# Patient Record
Sex: FEMALE | Race: WHITE | NOT HISPANIC OR LATINO | Employment: OTHER | URBAN - METROPOLITAN AREA
[De-identification: names, ages, dates, MRNs, and addresses within clinical notes are randomized per-mention and may not be internally consistent; named-entity substitution may affect disease eponyms.]

---

## 2017-03-16 ENCOUNTER — OFFICE VISIT (OUTPATIENT)
Dept: ORTHOPEDIC SURGERY | Facility: CLINIC | Age: 62
End: 2017-03-16

## 2017-03-16 VITALS
HEART RATE: 65 BPM | HEIGHT: 64 IN | SYSTOLIC BLOOD PRESSURE: 160 MMHG | WEIGHT: 220 LBS | BODY MASS INDEX: 37.56 KG/M2 | DIASTOLIC BLOOD PRESSURE: 83 MMHG

## 2017-03-16 DIAGNOSIS — M17.12 PRIMARY OSTEOARTHRITIS OF LEFT KNEE: ICD-10-CM

## 2017-03-16 DIAGNOSIS — R52 PAIN: Primary | ICD-10-CM

## 2017-03-16 DIAGNOSIS — M17.11 PRIMARY OSTEOARTHRITIS OF RIGHT KNEE: ICD-10-CM

## 2017-03-16 PROCEDURE — 73562 X-RAY EXAM OF KNEE 3: CPT | Performed by: ORTHOPAEDIC SURGERY

## 2017-03-16 PROCEDURE — 99213 OFFICE O/P EST LOW 20 MIN: CPT | Performed by: ORTHOPAEDIC SURGERY

## 2017-03-16 NOTE — PROGRESS NOTES
Subjective: Right knee pain     Patient ID: Lianalu Patel is a 61 y.o. female.    Chief Complaint:    History of Present Illness 61-year-old female presents for elevation of the right knee.  She underwent a left total knee by me last September did extremely well with the right knee is now symptomatic.  She is failed in the past cortisone a viscus supplement injections and she is again having pain with activities of daily living wants to proceed with a right total knee arthroplasty.       Social History     Occupational History   • Not on file.     Social History Main Topics   • Smoking status: Never Smoker   • Smokeless tobacco: Never Used   • Alcohol use No   • Drug use: No   • Sexual activity: Defer      Review of Systems   Constitutional: Negative for chills, diaphoresis, fever and unexpected weight change.   HENT: Negative for hearing loss, nosebleeds, sore throat and tinnitus.    Eyes: Negative for pain and visual disturbance.   Respiratory: Negative for cough, shortness of breath and wheezing.    Cardiovascular: Negative for chest pain and palpitations.   Gastrointestinal: Negative for abdominal pain, diarrhea, nausea and vomiting.   Endocrine: Negative for cold intolerance, heat intolerance and polydipsia.   Genitourinary: Negative for difficulty urinating, dysuria and hematuria.   Musculoskeletal: Positive for arthralgias and myalgias. Negative for joint swelling.   Skin: Negative for rash and wound.   Allergic/Immunologic: Negative for environmental allergies.   Neurological: Negative for dizziness, syncope and numbness.   Hematological: Does not bruise/bleed easily.   Psychiatric/Behavioral: Negative for dysphoric mood and sleep disturbance. The patient is not nervous/anxious.          Past Medical History   Diagnosis Date   • Arthritis    • Hypertension    • Left knee DJD      sched TKA   • OA (osteoarthritis)      Past Surgical History   Procedure Laterality Date   • Sinus surgery     • Cholecystectomy      • Appendectomy     • Tubal abdominal ligation     • Pr total knee arthroplasty Left 9/13/2016     Procedure: TOTAL KNEE ARTHROPLASTY lc orthoalign;  Surgeon: Jerry Yarbrough MD;  Location: [facility];  Service: Orthopedics     Family History   Problem Relation Age of Onset   • Hypertension Mother    • Hypertension Father    • Hypertension Sister    • Hypertension Brother          Objective:  Vitals:    03/16/17 1337   BP: 160/83   Pulse: 65     Last 3 weights    03/16/17  1337   Weight: 220 lb (99.8 kg)     Body mass index is 37.76 kg/(m^2).       Ortho Exam  AP and lateral sunrise view of the right knee shows tricompartmental changes particularly in the patellofemoral medial compartment.  No prior x-rays for comparison.  Exam of the knee shows no motor deficit good distal pulses.  There is marked pain and crepitus with range of motion which is 0-125 is no patella subluxation.  Joint line tenderness but negative Kelle's.  No instability 0 90°.    Assessment:       1. Pain    2. Primary osteoarthritis of left knee    3. Primary osteoarthritis of right knee          Plan:      she wishes to proceed with total joint replacement we'll schedule as soon as possible.      Work Status:    [employer] query complete.    Orders:  Orders Placed This Encounter   Procedures   • XR Knee 3 View Right       Medications:  No orders of the defined types were placed in this encounter.      Followup:  Return in about 2 weeks (around 3/30/2017).          Dragon transcription disclaimer     Much of this encounter note is an electronic transcription/translation of spoken language to printed text. The electronic translation of spoken language may permit erroneous, or at times, nonsensical words or phrases to be inadvertently transcribed. Although I have reviewed the note for such errors, some may still exist.

## 2017-03-17 ENCOUNTER — PREP FOR SURGERY (OUTPATIENT)
Dept: ORTHOPEDIC SURGERY | Facility: CLINIC | Age: 62
End: 2017-03-17

## 2017-03-17 DIAGNOSIS — M17.11 PRIMARY OSTEOARTHRITIS OF RIGHT KNEE: Primary | ICD-10-CM

## 2017-03-17 RX ORDER — CELECOXIB 100 MG/1
400 CAPSULE ORAL ONCE
Status: CANCELLED | OUTPATIENT
Start: 2017-04-04

## 2017-03-17 RX ORDER — PREGABALIN 25 MG/1
150 CAPSULE ORAL ONCE
Status: CANCELLED | OUTPATIENT
Start: 2017-03-17 | End: 2017-03-17

## 2017-03-17 RX ORDER — ACETAMINOPHEN 10 MG/ML
1000 INJECTION, SOLUTION INTRAVENOUS ONCE
Status: CANCELLED | OUTPATIENT
Start: 2017-04-04

## 2017-03-17 RX ORDER — OXYCODONE HCL 10 MG/1
10 TABLET, FILM COATED, EXTENDED RELEASE ORAL ONCE
Status: CANCELLED | OUTPATIENT
Start: 2017-03-17 | End: 2017-03-17

## 2017-03-21 ENCOUNTER — PREP FOR SURGERY (OUTPATIENT)
Dept: ORTHOPEDIC SURGERY | Facility: CLINIC | Age: 62
End: 2017-03-21

## 2017-03-21 DIAGNOSIS — M17.11 PRIMARY OSTEOARTHRITIS OF RIGHT KNEE: Primary | ICD-10-CM

## 2017-03-22 ENCOUNTER — APPOINTMENT (OUTPATIENT)
Dept: PREADMISSION TESTING | Facility: HOSPITAL | Age: 62
End: 2017-03-22

## 2017-03-28 ENCOUNTER — APPOINTMENT (OUTPATIENT)
Dept: PREADMISSION TESTING | Facility: HOSPITAL | Age: 62
End: 2017-03-28

## 2017-03-28 ENCOUNTER — HOSPITAL ENCOUNTER (OUTPATIENT)
Dept: GENERAL RADIOLOGY | Facility: HOSPITAL | Age: 62
Discharge: HOME | End: 2017-03-28
Admitting: ORTHOPAEDIC SURGERY

## 2017-03-28 VITALS
SYSTOLIC BLOOD PRESSURE: 118 MMHG | DIASTOLIC BLOOD PRESSURE: 77 MMHG | HEIGHT: 64 IN | HEART RATE: 66 BPM | RESPIRATION RATE: 16 BRPM | WEIGHT: 231.4 LBS | OXYGEN SATURATION: 96 % | BODY MASS INDEX: 39.5 KG/M2

## 2017-03-28 DIAGNOSIS — M17.11 PRIMARY OSTEOARTHRITIS OF RIGHT KNEE: ICD-10-CM

## 2017-03-28 LAB
ABO GROUP BLD: NORMAL
ANION GAP SERPL CALCULATED.3IONS-SCNC: 12.1 MMOL/L
BASOPHILS # BLD AUTO: 0.06 10*3/MM3 (ref 0–0.2)
BASOPHILS NFR BLD AUTO: 0.4 % (ref 0–2)
BILIRUB UR QL STRIP: NEGATIVE
BLD GP AB SCN SERPL QL: NEGATIVE
BUN BLD-MCNC: 11 MG/DL (ref 8–23)
BUN/CREAT SERPL: 15.7 (ref 7–25)
CALCIUM SPEC-SCNC: 9.1 MG/DL (ref 8.8–10.5)
CHLORIDE SERPL-SCNC: 101 MMOL/L (ref 98–107)
CLARITY UR: CLEAR
CO2 SERPL-SCNC: 29.9 MMOL/L (ref 22–29)
COLOR UR: YELLOW
CREAT BLD-MCNC: 0.7 MG/DL (ref 0.57–1)
DEPRECATED RDW RBC AUTO: 43.3 FL (ref 37–54)
EOSINOPHIL # BLD AUTO: 0.35 10*3/MM3 (ref 0.1–0.3)
EOSINOPHIL NFR BLD AUTO: 2.5 % (ref 0–4)
ERYTHROCYTE [DISTWIDTH] IN BLOOD BY AUTOMATED COUNT: 13.5 % (ref 11.5–14.5)
GFR SERPL CREATININE-BSD FRML MDRD: 85 ML/MIN/1.73
GLUCOSE BLD-MCNC: 115 MG/DL (ref 65–99)
GLUCOSE UR STRIP-MCNC: NEGATIVE MG/DL
HCT VFR BLD AUTO: 40 % (ref 37–47)
HGB BLD-MCNC: 13.3 G/DL (ref 12–16)
HGB UR QL STRIP.AUTO: NEGATIVE
IMM GRANULOCYTES # BLD: 0.09 10*3/MM3 (ref 0–0.03)
IMM GRANULOCYTES NFR BLD: 0.7 % (ref 0–0.5)
INR PPP: 0.95 (ref 0.9–1.1)
KETONES UR QL STRIP: NEGATIVE
LEUKOCYTE ESTERASE UR QL STRIP.AUTO: NEGATIVE
LYMPHOCYTES # BLD AUTO: 6.36 10*3/MM3 (ref 0.6–4.8)
LYMPHOCYTES NFR BLD AUTO: 46.3 % (ref 20–45)
MCH RBC QN AUTO: 29.1 PG (ref 27–31)
MCHC RBC AUTO-ENTMCNC: 33.3 G/DL (ref 31–37)
MCV RBC AUTO: 87.5 FL (ref 81–99)
MONOCYTES # BLD AUTO: 0.76 10*3/MM3 (ref 0–1)
MONOCYTES NFR BLD AUTO: 5.5 % (ref 3–8)
NEUTROPHILS # BLD AUTO: 6.12 10*3/MM3 (ref 1.5–8.3)
NEUTROPHILS NFR BLD AUTO: 44.6 % (ref 45–70)
NITRITE UR QL STRIP: NEGATIVE
NRBC BLD MANUAL-RTO: 0 /100 WBC (ref 0–0)
PH UR STRIP.AUTO: 6 [PH] (ref 4.5–8)
PLATELET # BLD AUTO: 234 10*3/MM3 (ref 140–500)
PMV BLD AUTO: 9.7 FL (ref 7.4–10.4)
POTASSIUM BLD-SCNC: 4.2 MMOL/L (ref 3.5–5.2)
PROT UR QL STRIP: NEGATIVE
PROTHROMBIN TIME: 12.7 SECONDS (ref 12.1–15)
RBC # BLD AUTO: 4.57 10*6/MM3 (ref 4.2–5.4)
RH BLD: POSITIVE
SODIUM BLD-SCNC: 143 MMOL/L (ref 136–145)
SP GR UR STRIP: 1.01 (ref 1–1.03)
UROBILINOGEN UR QL STRIP: NORMAL
WBC NRBC COR # BLD: 13.74 10*3/MM3 (ref 4.8–10.8)

## 2017-03-28 PROCEDURE — 93005 ELECTROCARDIOGRAM TRACING: CPT

## 2017-03-28 PROCEDURE — 36415 COLL VENOUS BLD VENIPUNCTURE: CPT

## 2017-03-28 PROCEDURE — 86900 BLOOD TYPING SEROLOGIC ABO: CPT | Performed by: ORTHOPAEDIC SURGERY

## 2017-03-28 PROCEDURE — 86901 BLOOD TYPING SEROLOGIC RH(D): CPT | Performed by: ORTHOPAEDIC SURGERY

## 2017-03-28 PROCEDURE — 93010 ELECTROCARDIOGRAM REPORT: CPT | Performed by: INTERNAL MEDICINE

## 2017-03-28 PROCEDURE — 85025 COMPLETE CBC W/AUTO DIFF WBC: CPT | Performed by: ORTHOPAEDIC SURGERY

## 2017-03-28 PROCEDURE — 80048 BASIC METABOLIC PNL TOTAL CA: CPT | Performed by: ORTHOPAEDIC SURGERY

## 2017-03-28 PROCEDURE — 71020 HC CHEST PA AND LATERAL: CPT

## 2017-03-28 PROCEDURE — 85610 PROTHROMBIN TIME: CPT | Performed by: ORTHOPAEDIC SURGERY

## 2017-03-28 PROCEDURE — 81003 URINALYSIS AUTO W/O SCOPE: CPT | Performed by: ORTHOPAEDIC SURGERY

## 2017-03-28 PROCEDURE — 86850 RBC ANTIBODY SCREEN: CPT | Performed by: ORTHOPAEDIC SURGERY

## 2017-03-28 RX ORDER — MULTIPLE VITAMINS W/ MINERALS TAB 9MG-400MCG
1 TAB ORAL EVERY MORNING
COMMUNITY

## 2017-03-28 RX ORDER — ASPIRIN 81 MG/1
81 TABLET ORAL EVERY MORNING
COMMUNITY
End: 2017-04-07 | Stop reason: HOSPADM

## 2017-03-28 RX ORDER — POTASSIUM CHLORIDE 20 MEQ/1
20 TABLET, EXTENDED RELEASE ORAL 3 TIMES DAILY
COMMUNITY
End: 2022-03-18

## 2017-03-28 RX ORDER — AMLODIPINE BESYLATE 5 MG/1
5 TABLET ORAL 2 TIMES DAILY
COMMUNITY
End: 2017-04-07 | Stop reason: HOSPADM

## 2017-03-28 NOTE — PAT
PT. HERE FOR PAT VISIT. LABS, EKG, CXR COMPLETE. PT. DID NOT ATTEND JOINT CAMP B/C HAD L TKA LAST SEPT. PT. STATES STILL HAS JOINT CAMP BINDER. PRE-OP INSTRUCTIONS REVIEWED WITH PTKy RAMIREZ RN

## 2017-04-03 ENCOUNTER — OFFICE VISIT (OUTPATIENT)
Dept: ORTHOPEDIC SURGERY | Facility: CLINIC | Age: 62
End: 2017-04-03

## 2017-04-03 ENCOUNTER — ANESTHESIA EVENT (OUTPATIENT)
Dept: PERIOP | Facility: HOSPITAL | Age: 62
End: 2017-04-03

## 2017-04-03 ENCOUNTER — PREP FOR SURGERY (OUTPATIENT)
Dept: ORTHOPEDIC SURGERY | Facility: CLINIC | Age: 62
End: 2017-04-03

## 2017-04-03 DIAGNOSIS — M17.11 PRIMARY OSTEOARTHRITIS OF RIGHT KNEE: Primary | ICD-10-CM

## 2017-04-03 PROCEDURE — S0260 H&P FOR SURGERY: HCPCS | Performed by: ORTHOPAEDIC SURGERY

## 2017-04-03 NOTE — PROGRESS NOTES
Subjective: Right knee pain      Patient ID: Lianalu Patel is a 61 y.o. female.    Chief Complaint:    History of Present Illness patient seen for H&P done undergo right total knee arthroplasty tomorrow       Social History     Occupational History   • Not on file.     Social History Main Topics   • Smoking status: Never Smoker   • Smokeless tobacco: Never Used   • Alcohol use No   • Drug use: No   • Sexual activity: Defer      Review of Systems   Constitutional: Negative for chills, diaphoresis, fever and unexpected weight change.   HENT: Negative for hearing loss, nosebleeds, sore throat and tinnitus.    Eyes: Negative for pain and visual disturbance.   Respiratory: Negative for cough, shortness of breath and wheezing.    Cardiovascular: Negative for chest pain and palpitations.   Gastrointestinal: Negative for abdominal pain, diarrhea, nausea and vomiting.   Endocrine: Negative for cold intolerance, heat intolerance and polydipsia.   Genitourinary: Negative for difficulty urinating, dysuria and hematuria.   Musculoskeletal: Negative for arthralgias, joint swelling and myalgias.   Skin: Negative for rash and wound.   Allergic/Immunologic: Negative for environmental allergies.   Neurological: Negative for dizziness, syncope and numbness.   Hematological: Does not bruise/bleed easily.   Psychiatric/Behavioral: Negative for dysphoric mood and sleep disturbance. The patient is not nervous/anxious.    All other systems reviewed and are negative.        Past Medical History:   Diagnosis Date   • Arthritis    • Hypertension    • Kidney stones    • Left knee DJD     KNEE REPLACED   • OA (osteoarthritis)    • Right knee pain     SCHEDULED FOR REPLACEMENT     Past Surgical History:   Procedure Laterality Date   • APPENDECTOMY     • CHOLECYSTECTOMY     • [place] TOTAL KNEE ARTHROPLASTY Left 9/13/2016    Procedure: TOTAL KNEE ARTHROPLASTY lc orthoalign;  Surgeon: Jerry Yarbrough MD;  Location: [facility];  Service: Orthopedics    • SINUS SURGERY     • TUBAL ABDOMINAL LIGATION       Family History   Problem Relation Age of Onset   • Hypertension Mother    • Hypertension Father    • Hypertension Sister    • Hypertension Brother          Objective:  There were no vitals filed for this visit.  There were no vitals filed for this visit.  There is no height or weight on file to calculate BMI.       Ortho Exam  H&P completed    Assessment:       1. Primary osteoarthritis of right knee          Plan:      all questions regard.ing the surgery answered      Work Status:    ANGEL query complete.    Orders:  No orders of the defined types were placed in this encounter.      Medications:  No orders of the defined types were placed in this encounter.      Followup:  Return in about 2 weeks (around 4/17/2017).          Dragon transcription disclaimer     Much of this encounter note is an electronic transcription/translation of spoken language to printed text. The electronic translation of spoken language may permit erroneous, or at times, nonsensical words or phrases to be inadvertently transcribed. Although I have reviewed the note for such errors, some may still exist.

## 2017-04-03 NOTE — H&P
Orthopedic Surgery    Patient Care Team:  Cole Cam MD as PCP - General (Family Medicine)    CHIEF COMPLAINT: Right knee pain    HISTORY OF PRESENT ILLNESS: 61-year-old female with end-stage degenerative arthritis of the right knee that is failed to respond to nonoperative management is being admitted this time to undergo right total knee arthroplasty.  Patient is failed to respond to nonsteroidal anti-inflammatories, cortisone injection in viscus supplement injections and modification of activity.  Patient underwent a successful left total knee arthroplasty September.  Reviewed once again with her over the risk of surgery which include but not limited to an infection and the need for multiple procedures including implant removal to eradicate infection, DVT, foot drop, vascular injury, periprosthetic fracture, the need for revision surgery and the fact that 10-15% still a pain and discomfort despite a well implanted knee.  She understands these restrictions and agrees to proceed.  Also understands the lengthy rehabilitation which is 3 months to a year.          Past Medical History:   Diagnosis Date   • Arthritis    • Hypertension    • Kidney stones    • Left knee DJD     KNEE REPLACED   • OA (osteoarthritis)    • Right knee pain     SCHEDULED FOR REPLACEMENT     Past Surgical History:   Procedure Laterality Date   • APPENDECTOMY     • CHOLECYSTECTOMY     • [place] TOTAL KNEE ARTHROPLASTY Left 9/13/2016    Procedure: TOTAL KNEE ARTHROPLASTY lc orthoalign;  Surgeon: Jerry Yarbrough MD;  Location: [facility];  Service: Orthopedics   • SINUS SURGERY     • TUBAL ABDOMINAL LIGATION       Family History   Problem Relation Age of Onset   • Hypertension Mother    • Hypertension Father    • Hypertension Sister    • Hypertension Brother      Social History   Substance Use Topics   • Smoking status: Never Smoker   • Smokeless tobacco: Never Used   • Alcohol use No     No prescriptions prior to admission.      Allergies:  Bee venom; Latex; and Penicillins    REVIEW OF SYSTEMS:  Please see the above history of present illness for pertinent positives and negatives.  The remainder of the patient's systems have been reviewed and are negative.    Vital Signs            Physical Exam:  Physical Exam   Constitutional: Patient appears well-developed and well-nourished and in no acute distress   HEENT:   Head: Normocephalic and atraumatic.   Eyes:  Pupils are equal, round, and reactive to light.  Mouth and Throat: Patient has moist mucous membranes. Oropharynx is clear of any erythema or exudate.     Neck: Neck supple. No JVD present. No thyromegaly present. No lymphadenopathy present.  Cardiovascular: Regular rate, regular rhythm.  Pulmonary/Chest: Lungs are clear to auscultation bilaterally.  Abdominal:benign,soft with bowel sounds  Musculoskeletal: Normal posture.  Extremities:   Right leg shows good distal pulses no motor deficit.  She has 0-120° of motion with crepitus and pain throughout the arc of motion but no instability.  The skin is cool to touch.  His no sensory loss.  There is no hip pain.  There is no instability.  Neurological: Patient is alert and oriented.  Psychological:   Mood and behavior appropriate.  Skin: Skin is warm and dry.     Results Review:    I reviewed the patient's new clinical results.  Lab Results (most recent)     None          Imaging Results (most recent)     None            ECG/EMG Results (most recent)     None            Assessment/Plan End-stage degenerative arthritis right knee         I discussed the patients findings and my recommendations with patient and   Plan to proceed with right total knee arthroplasty    Jerry Yarbrough MD  04/03/17  2:40 PM

## 2017-04-04 ENCOUNTER — HOSPITAL ENCOUNTER (INPATIENT)
Facility: HOSPITAL | Age: 62
LOS: 3 days | Discharge: HOME | End: 2017-04-07
Attending: ORTHOPAEDIC SURGERY | Admitting: HOSPITALIST

## 2017-04-04 ENCOUNTER — APPOINTMENT (OUTPATIENT)
Dept: GENERAL RADIOLOGY | Facility: HOSPITAL | Age: 62
End: 2017-04-04

## 2017-04-04 ENCOUNTER — ANESTHESIA (OUTPATIENT)
Dept: PERIOP | Facility: HOSPITAL | Age: 62
End: 2017-04-04

## 2017-04-04 DIAGNOSIS — M17.11 PRIMARY OSTEOARTHRITIS OF RIGHT KNEE: ICD-10-CM

## 2017-04-04 LAB — POTASSIUM BLD-SCNC: 3.7 MMOL/L (ref 3.5–5.2)

## 2017-04-04 PROCEDURE — 25010000002 MIDAZOLAM PER 1 MG: Performed by: NURSE ANESTHETIST, CERTIFIED REGISTERED

## 2017-04-04 PROCEDURE — C1713 ANCHOR/SCREW BN/BN,TIS/BN: HCPCS | Performed by: ORTHOPAEDIC SURGERY

## 2017-04-04 PROCEDURE — 25010000002 PROPOFOL 1000 MG/ML EMULSION: Performed by: NURSE ANESTHETIST, CERTIFIED REGISTERED

## 2017-04-04 PROCEDURE — 27244 TREAT THIGH FRACTURE: CPT | Performed by: ORTHOPAEDIC SURGERY

## 2017-04-04 PROCEDURE — 25010000002 ONDANSETRON PER 1 MG: Performed by: NURSE ANESTHETIST, CERTIFIED REGISTERED

## 2017-04-04 PROCEDURE — XR2G021 MONITORING OF RIGHT KNEE JOINT USING INTRAOPERATIVE KNEE REPLACEMENT SENSOR, OPEN APPROACH, NEW TECHNOLOGY GROUP 1: ICD-10-PCS | Performed by: ORTHOPAEDIC SURGERY

## 2017-04-04 PROCEDURE — 84132 ASSAY OF SERUM POTASSIUM: CPT | Performed by: NURSE ANESTHETIST, CERTIFIED REGISTERED

## 2017-04-04 PROCEDURE — 25010000002 DIPHENHYDRAMINE PER 50 MG: Performed by: NURSE ANESTHETIST, CERTIFIED REGISTERED

## 2017-04-04 PROCEDURE — 99232 SBSQ HOSP IP/OBS MODERATE 35: CPT | Performed by: HOSPITALIST

## 2017-04-04 PROCEDURE — 25010000002 ROPIVACAINE PER 1 MG: Performed by: NURSE ANESTHETIST, CERTIFIED REGISTERED

## 2017-04-04 PROCEDURE — 73560 X-RAY EXAM OF KNEE 1 OR 2: CPT

## 2017-04-04 PROCEDURE — 25010000002 FENTANYL CITRATE (PF) 100 MCG/2ML SOLUTION: Performed by: NURSE ANESTHETIST, CERTIFIED REGISTERED

## 2017-04-04 PROCEDURE — 25010000002 DEXAMETHASONE PER 1 MG: Performed by: NURSE ANESTHETIST, CERTIFIED REGISTERED

## 2017-04-04 PROCEDURE — 20985 CPTR-ASST DIR MS PX: CPT | Performed by: ORTHOPAEDIC SURGERY

## 2017-04-04 PROCEDURE — 25010000002 VANCOMYCIN PER 500 MG: Performed by: ORTHOPAEDIC SURGERY

## 2017-04-04 PROCEDURE — C1776 JOINT DEVICE (IMPLANTABLE): HCPCS | Performed by: ORTHOPAEDIC SURGERY

## 2017-04-04 PROCEDURE — 0SRC0J9 REPLACEMENT OF RIGHT KNEE JOINT WITH SYNTHETIC SUBSTITUTE, CEMENTED, OPEN APPROACH: ICD-10-PCS | Performed by: ORTHOPAEDIC SURGERY

## 2017-04-04 PROCEDURE — L1830 KO IMMOB CANVAS LONG PRE OTS: HCPCS | Performed by: ORTHOPAEDIC SURGERY

## 2017-04-04 PROCEDURE — 94799 UNLISTED PULMONARY SVC/PX: CPT

## 2017-04-04 DEVICE — IMPLANTABLE DEVICE: Type: IMPLANTABLE DEVICE | Site: KNEE | Status: FUNCTIONAL

## 2017-04-04 DEVICE — SMARTSET HIGH PERFORMANCE MV MEDIUM VISCOSITY BONE CEMENT 40G
Type: IMPLANTABLE DEVICE | Site: KNEE | Status: FUNCTIONAL
Brand: SMARTSET

## 2017-04-04 DEVICE — COMP FEM TRIATH CR NO4 RT: Type: IMPLANTABLE DEVICE | Site: KNEE | Status: FUNCTIONAL

## 2017-04-04 DEVICE — INSRT TIB/KN TRIATHLON CONDY/STBL X3 SZ4 9MM: Type: IMPLANTABLE DEVICE | Site: KNEE | Status: FUNCTIONAL

## 2017-04-04 DEVICE — TOTL KN HI DEMAND STRYKER: Type: IMPLANTABLE DEVICE | Site: KNEE | Status: FUNCTIONAL

## 2017-04-04 DEVICE — BASEPLT TIB TRIATH CMT NO4: Type: IMPLANTABLE DEVICE | Site: KNEE | Status: FUNCTIONAL

## 2017-04-04 RX ORDER — BUPIVACAINE HYDROCHLORIDE 5 MG/ML
INJECTION, SOLUTION EPIDURAL; INTRACAUDAL AS NEEDED
Status: DISCONTINUED | OUTPATIENT
Start: 2017-04-04 | End: 2017-04-04 | Stop reason: SURG

## 2017-04-04 RX ORDER — MIDAZOLAM HYDROCHLORIDE 1 MG/ML
INJECTION INTRAMUSCULAR; INTRAVENOUS AS NEEDED
Status: DISCONTINUED | OUTPATIENT
Start: 2017-04-04 | End: 2017-04-04 | Stop reason: SURG

## 2017-04-04 RX ORDER — ACETAMINOPHEN 500 MG
1000 TABLET ORAL EVERY 6 HOURS
Status: DISCONTINUED | OUTPATIENT
Start: 2017-04-04 | End: 2017-04-07 | Stop reason: HOSPADM

## 2017-04-04 RX ORDER — CLINDAMYCIN PHOSPHATE 900 MG/50ML
900 INJECTION INTRAVENOUS ONCE
Status: COMPLETED | OUTPATIENT
Start: 2017-04-04 | End: 2017-04-04

## 2017-04-04 RX ORDER — POTASSIUM CHLORIDE 20 MEQ/1
20 TABLET, EXTENDED RELEASE ORAL 3 TIMES DAILY
Status: DISCONTINUED | OUTPATIENT
Start: 2017-04-04 | End: 2017-04-07 | Stop reason: HOSPADM

## 2017-04-04 RX ORDER — ACETAMINOPHEN 10 MG/ML
1000 INJECTION, SOLUTION INTRAVENOUS ONCE
Status: COMPLETED | OUTPATIENT
Start: 2017-04-04 | End: 2017-04-04

## 2017-04-04 RX ORDER — SODIUM CHLORIDE, SODIUM LACTATE, POTASSIUM CHLORIDE, CALCIUM CHLORIDE 600; 310; 30; 20 MG/100ML; MG/100ML; MG/100ML; MG/100ML
9 INJECTION, SOLUTION INTRAVENOUS CONTINUOUS PRN
Status: DISCONTINUED | OUTPATIENT
Start: 2017-04-04 | End: 2017-04-04 | Stop reason: HOSPADM

## 2017-04-04 RX ORDER — METOPROLOL TARTRATE 50 MG/1
100 TABLET, FILM COATED ORAL EVERY 12 HOURS SCHEDULED
Status: DISCONTINUED | OUTPATIENT
Start: 2017-04-04 | End: 2017-04-07 | Stop reason: HOSPADM

## 2017-04-04 RX ORDER — MAGNESIUM HYDROXIDE 1200 MG/15ML
LIQUID ORAL AS NEEDED
Status: DISCONTINUED | OUTPATIENT
Start: 2017-04-04 | End: 2017-04-04 | Stop reason: HOSPADM

## 2017-04-04 RX ORDER — PREGABALIN 75 MG/1
75 CAPSULE ORAL EVERY 12 HOURS SCHEDULED
Status: DISCONTINUED | OUTPATIENT
Start: 2017-04-04 | End: 2017-04-07 | Stop reason: HOSPADM

## 2017-04-04 RX ORDER — ONDANSETRON 2 MG/ML
4 INJECTION INTRAMUSCULAR; INTRAVENOUS ONCE AS NEEDED
Status: COMPLETED | OUTPATIENT
Start: 2017-04-04 | End: 2017-04-04

## 2017-04-04 RX ORDER — PREGABALIN 75 MG/1
150 CAPSULE ORAL ONCE
Status: COMPLETED | OUTPATIENT
Start: 2017-04-04 | End: 2017-04-04

## 2017-04-04 RX ORDER — SODIUM CHLORIDE, SODIUM LACTATE, POTASSIUM CHLORIDE, CALCIUM CHLORIDE 600; 310; 30; 20 MG/100ML; MG/100ML; MG/100ML; MG/100ML
30 INJECTION, SOLUTION INTRAVENOUS CONTINUOUS
Status: DISCONTINUED | OUTPATIENT
Start: 2017-04-04 | End: 2017-04-05

## 2017-04-04 RX ORDER — LIDOCAINE HYDROCHLORIDE 10 MG/ML
INJECTION, SOLUTION EPIDURAL; INFILTRATION; INTRACAUDAL; PERINEURAL
Status: DISPENSED
Start: 2017-04-04 | End: 2017-04-04

## 2017-04-04 RX ORDER — BISACODYL 5 MG/1
10 TABLET, DELAYED RELEASE ORAL DAILY PRN
Status: DISCONTINUED | OUTPATIENT
Start: 2017-04-04 | End: 2017-04-07 | Stop reason: HOSPADM

## 2017-04-04 RX ORDER — ROPIVACAINE HYDROCHLORIDE 5 MG/ML
INJECTION, SOLUTION EPIDURAL; INFILTRATION; PERINEURAL AS NEEDED
Status: DISCONTINUED | OUTPATIENT
Start: 2017-04-04 | End: 2017-04-04 | Stop reason: SURG

## 2017-04-04 RX ORDER — CELECOXIB 200 MG/1
200 CAPSULE ORAL DAILY
Status: DISCONTINUED | OUTPATIENT
Start: 2017-04-04 | End: 2017-04-07 | Stop reason: HOSPADM

## 2017-04-04 RX ORDER — SODIUM CHLORIDE, SODIUM LACTATE, POTASSIUM CHLORIDE, CALCIUM CHLORIDE 600; 310; 30; 20 MG/100ML; MG/100ML; MG/100ML; MG/100ML
1000 INJECTION, SOLUTION INTRAVENOUS CONTINUOUS PRN
Status: DISCONTINUED | OUTPATIENT
Start: 2017-04-04 | End: 2017-04-04 | Stop reason: HOSPADM

## 2017-04-04 RX ORDER — METOPROLOL SUCCINATE 50 MG/1
100 TABLET, EXTENDED RELEASE ORAL 2 TIMES DAILY
Status: DISCONTINUED | OUTPATIENT
Start: 2017-04-04 | End: 2017-04-04

## 2017-04-04 RX ORDER — FENTANYL CITRATE 50 UG/ML
INJECTION, SOLUTION INTRAMUSCULAR; INTRAVENOUS AS NEEDED
Status: DISCONTINUED | OUTPATIENT
Start: 2017-04-04 | End: 2017-04-04 | Stop reason: SURG

## 2017-04-04 RX ORDER — LIDOCAINE HYDROCHLORIDE 10 MG/ML
0.5 INJECTION, SOLUTION INFILTRATION; PERINEURAL ONCE AS NEEDED
Status: DISCONTINUED | OUTPATIENT
Start: 2017-04-04 | End: 2017-04-04 | Stop reason: HOSPADM

## 2017-04-04 RX ORDER — DIPHENHYDRAMINE HYDROCHLORIDE 50 MG/ML
12.5 INJECTION INTRAMUSCULAR; INTRAVENOUS ONCE
Status: COMPLETED | OUTPATIENT
Start: 2017-04-04 | End: 2017-04-04

## 2017-04-04 RX ORDER — VANCOMYCIN HCL-SODIUM CHLORIDE IV SOLN 1.5 GM/250ML-0.9% 1.5-0.9/25 GM/ML-%
15 SOLUTION INTRAVENOUS ONCE
Status: COMPLETED | OUTPATIENT
Start: 2017-04-05 | End: 2017-04-05

## 2017-04-04 RX ORDER — CELECOXIB 100 MG/1
400 CAPSULE ORAL ONCE
Status: COMPLETED | OUTPATIENT
Start: 2017-04-04 | End: 2017-04-04

## 2017-04-04 RX ORDER — DEXAMETHASONE SODIUM PHOSPHATE 4 MG/ML
INJECTION, SOLUTION INTRA-ARTICULAR; INTRALESIONAL; INTRAMUSCULAR; INTRAVENOUS; SOFT TISSUE AS NEEDED
Status: DISCONTINUED | OUTPATIENT
Start: 2017-04-04 | End: 2017-04-04 | Stop reason: SURG

## 2017-04-04 RX ORDER — BACITRACIN ZINC 500 [USP'U]/G
OINTMENT TOPICAL AS NEEDED
Status: DISCONTINUED | OUTPATIENT
Start: 2017-04-04 | End: 2017-04-04 | Stop reason: HOSPADM

## 2017-04-04 RX ORDER — SODIUM CHLORIDE 9 MG/ML
INJECTION, SOLUTION INTRAVENOUS AS NEEDED
Status: DISCONTINUED | OUTPATIENT
Start: 2017-04-04 | End: 2017-04-04 | Stop reason: HOSPADM

## 2017-04-04 RX ORDER — SODIUM CHLORIDE 0.9 % (FLUSH) 0.9 %
3 SYRINGE (ML) INJECTION AS NEEDED
Status: DISCONTINUED | OUTPATIENT
Start: 2017-04-04 | End: 2017-04-04 | Stop reason: HOSPADM

## 2017-04-04 RX ORDER — ASPIRIN 325 MG
325 TABLET ORAL EVERY 12 HOURS
Status: DISCONTINUED | OUTPATIENT
Start: 2017-04-04 | End: 2017-04-07 | Stop reason: HOSPADM

## 2017-04-04 RX ORDER — BISACODYL 10 MG
10 SUPPOSITORY, RECTAL RECTAL DAILY PRN
Status: DISCONTINUED | OUTPATIENT
Start: 2017-04-04 | End: 2017-04-07 | Stop reason: HOSPADM

## 2017-04-04 RX ORDER — SENNA AND DOCUSATE SODIUM 50; 8.6 MG/1; MG/1
2 TABLET, FILM COATED ORAL 2 TIMES DAILY
Status: DISCONTINUED | OUTPATIENT
Start: 2017-04-04 | End: 2017-04-07 | Stop reason: HOSPADM

## 2017-04-04 RX ORDER — OXYCODONE HCL 10 MG/1
10 TABLET, FILM COATED, EXTENDED RELEASE ORAL ONCE
Status: COMPLETED | OUTPATIENT
Start: 2017-04-04 | End: 2017-04-04

## 2017-04-04 RX ORDER — FAMOTIDINE 10 MG/ML
20 INJECTION, SOLUTION INTRAVENOUS ONCE
Status: COMPLETED | OUTPATIENT
Start: 2017-04-04 | End: 2017-04-04

## 2017-04-04 RX ORDER — OXYCODONE HYDROCHLORIDE 5 MG/1
10 TABLET ORAL EVERY 4 HOURS PRN
Status: DISCONTINUED | OUTPATIENT
Start: 2017-04-04 | End: 2017-04-06

## 2017-04-04 RX ORDER — AMLODIPINE BESYLATE 5 MG/1
5 TABLET ORAL 2 TIMES DAILY
Status: DISCONTINUED | OUTPATIENT
Start: 2017-04-04 | End: 2017-04-05

## 2017-04-04 RX ORDER — METOPROLOL TARTRATE 100 MG/1
100 TABLET ORAL 2 TIMES DAILY
COMMUNITY

## 2017-04-04 RX ORDER — CLINDAMYCIN PHOSPHATE 900 MG/50ML
900 INJECTION INTRAVENOUS EVERY 8 HOURS
Status: COMPLETED | OUTPATIENT
Start: 2017-04-04 | End: 2017-04-05

## 2017-04-04 RX ADMIN — ROPIVACAINE HYDROCHLORIDE 20 ML: 5 INJECTION, SOLUTION EPIDURAL; INFILTRATION; PERINEURAL at 13:10

## 2017-04-04 RX ADMIN — SODIUM CHLORIDE 1000 MG: 900 INJECTION, SOLUTION INTRAVENOUS at 12:11

## 2017-04-04 RX ADMIN — CLINDAMYCIN PHOSPHATE 900 MG: 18 INJECTION, SOLUTION INTRAVENOUS at 14:07

## 2017-04-04 RX ADMIN — MIDAZOLAM HYDROCHLORIDE 0.5 MG: 1 INJECTION, SOLUTION INTRAMUSCULAR; INTRAVENOUS at 13:04

## 2017-04-04 RX ADMIN — BUPIVACAINE HYDROCHLORIDE 3 ML: 5 INJECTION, SOLUTION EPIDURAL; INTRACAUDAL; PERINEURAL at 13:55

## 2017-04-04 RX ADMIN — OXYCODONE HYDROCHLORIDE 10 MG: 10 TABLET, FILM COATED, EXTENDED RELEASE ORAL at 11:31

## 2017-04-04 RX ADMIN — AMLODIPINE BESYLATE 5 MG: 5 TABLET ORAL at 19:44

## 2017-04-04 RX ADMIN — ACETAMINOPHEN 1000 MG: 500 TABLET ORAL at 19:02

## 2017-04-04 RX ADMIN — PROPOFOL 100 MCG/KG/MIN: 10 INJECTION, EMULSION INTRAVENOUS at 14:08

## 2017-04-04 RX ADMIN — ACETAMINOPHEN 1000 MG: 10 INJECTION, SOLUTION INTRAVENOUS at 12:09

## 2017-04-04 RX ADMIN — SODIUM CHLORIDE, POTASSIUM CHLORIDE, SODIUM LACTATE AND CALCIUM CHLORIDE 1000 ML: 600; 310; 30; 20 INJECTION, SOLUTION INTRAVENOUS at 12:10

## 2017-04-04 RX ADMIN — OXYCODONE HYDROCHLORIDE 10 MG: 5 TABLET ORAL at 19:02

## 2017-04-04 RX ADMIN — PREGABALIN 75 MG: 75 CAPSULE ORAL at 20:41

## 2017-04-04 RX ADMIN — DEXAMETHASONE SODIUM PHOSPHATE 4 MG: 4 INJECTION, SOLUTION INTRAMUSCULAR; INTRAVENOUS at 13:10

## 2017-04-04 RX ADMIN — MIDAZOLAM HYDROCHLORIDE 2 MG: 1 INJECTION, SOLUTION INTRAMUSCULAR; INTRAVENOUS at 13:00

## 2017-04-04 RX ADMIN — FAMOTIDINE 20 MG: 10 INJECTION, SOLUTION INTRAVENOUS at 12:54

## 2017-04-04 RX ADMIN — ASPIRIN 325 MG: 325 TABLET ORAL at 19:44

## 2017-04-04 RX ADMIN — SODIUM CHLORIDE 1000 MG: 9 INJECTION, SOLUTION INTRAVENOUS at 14:16

## 2017-04-04 RX ADMIN — FENTANYL CITRATE 50 MCG: 50 INJECTION, SOLUTION INTRAMUSCULAR; INTRAVENOUS at 15:33

## 2017-04-04 RX ADMIN — CELECOXIB 400 MG: 100 CAPSULE ORAL at 12:09

## 2017-04-04 RX ADMIN — CLINDAMYCIN PHOSPHATE 900 MG: 18 INJECTION, SOLUTION INTRAVENOUS at 21:26

## 2017-04-04 RX ADMIN — SODIUM CHLORIDE 1000 MG: 9 INJECTION, SOLUTION INTRAVENOUS at 15:32

## 2017-04-04 RX ADMIN — ONDANSETRON 4 MG: 2 INJECTION, SOLUTION INTRAMUSCULAR; INTRAVENOUS at 16:08

## 2017-04-04 RX ADMIN — DOCUSATE SODIUM AND SENNOSIDES 2 TABLET: 8.6; 5 TABLET, FILM COATED ORAL at 19:02

## 2017-04-04 RX ADMIN — POTASSIUM CHLORIDE 20 MEQ: 20 TABLET, EXTENDED RELEASE ORAL at 20:39

## 2017-04-04 RX ADMIN — ONDANSETRON 4 MG: 2 INJECTION, SOLUTION INTRAMUSCULAR; INTRAVENOUS at 12:55

## 2017-04-04 RX ADMIN — DIPHENHYDRAMINE HYDROCHLORIDE 12.5 MG: 50 INJECTION, SOLUTION INTRAMUSCULAR; INTRAVENOUS at 12:54

## 2017-04-04 RX ADMIN — FENTANYL CITRATE 25 MCG: 50 INJECTION, SOLUTION INTRAMUSCULAR; INTRAVENOUS at 13:04

## 2017-04-04 RX ADMIN — FENTANYL CITRATE 50 MCG: 50 INJECTION, SOLUTION INTRAMUSCULAR; INTRAVENOUS at 15:29

## 2017-04-04 RX ADMIN — CELECOXIB 200 MG: 200 CAPSULE ORAL at 19:01

## 2017-04-04 RX ADMIN — PREGABALIN 150 MG: 75 CAPSULE ORAL at 11:31

## 2017-04-04 NOTE — ANESTHESIA PROCEDURE NOTES
Peripheral Block    Patient location during procedure: pre-op  Start time: 4/4/2017 1:00 PM  Stop time: 4/4/2017 1:10 PM  Reason for block: at surgeon's request and post-op pain management  Performed by  CRNA: MASSIEL SAPP  Preanesthetic Checklist  Completed: patient identified, site marked, surgical consent, pre-op evaluation, timeout performed, IV checked, risks and benefits discussed and monitors and equipment checked  Peripheral Block Prep:  Sterile barriers:cap, gloves and mask  Prep: ChloraPrep  Patient monitoring: blood pressure monitoring, continuous pulse oximetry and EKG  Peripheral Procedure  Sedation:yes  Guidance:ultrasound guided  Images:still images obtained    Laterality:right  Block Type:adductor canal block  Injection Technique:single-shot  Needle Type:echogenic  Needle Gauge:21 G  ULTRASOUND INTERPRETATION.  Using ultrasound guidance a 21 G gauge needle was placed in close proximity to the brachial plexus (ACB) nerve, at which point, under ultrasound guidance anesthetic was injected in the area of the nerve and spread of the anesthesia was seen on ultrasound in close proximity thereto.  There were no abnormalities seen on ultrasound; a digital image was taken; and the patient tolerated the procedure with no complications.   Medications  Comment:DECADRON 4MG ADDED   LIDO 2% LOCAL    NEEDLE IN VIEW THROUGHOUT INJECTION  Local Injected:ropivacaine 0.5% Local Amount Injected:20mL  Post Assessment  Injection Assessment: negative aspiration for heme, no paresthesia on injection and incremental injection  Patient Tolerance:comfortable throughout block  Complications:no

## 2017-04-04 NOTE — PLAN OF CARE
Problem: Patient Care Overview (Adult)  Goal: Adult Individualization and Mutuality  Outcome: Ongoing (interventions implemented as appropriate)    04/04/17 1148   Individualization   Patient Specific Preferences goes by abner

## 2017-04-04 NOTE — H&P
[facility] MEDICAL [facility] HOSPITALIST     Cole Margaret Cam MD    CHIEF COMPLAINT: Right knee pain with end stage degenerative arthritis of the right knee    HISTORY OF PRESENT ILLNESS:    62 y/o female with hypertension, DJD and OA, Obesity and h/o nephrolithiasis (last stone 10 years ago) presented to the hospital today to undergo right total knee arthroplasty secondary to end stage degenerative arthritis of the right knee that failed to respond to non-operative management.  The patient failed to respond to nonsteroidal anti-inflammatories, cortisone injection and viscus supplement injections and modification of activity.  She underwent successful left TKA in September of last year. The patient is now post op and notes she is feeling well.  No recent illness (had the flu about a month ago but all symptoms resolved).  No pain currently as block still in effect. Patient denies any CP, SOA or heart palpitations.  Denies any F/C, N/V/D. Denies LE edema or lightheadedness.  Was in her usual state of health prior to surgery.       Past Medical History:   Diagnosis Date   • Arthritis    • Hypertension    • Kidney stones    • Left knee DJD     KNEE REPLACED   • OA (osteoarthritis)    • Right knee pain     SCHEDULED FOR REPLACEMENT     Past Surgical History:   Procedure Laterality Date   • APPENDECTOMY     • CHOLECYSTECTOMY     • CT TOTAL KNEE ARTHROPLASTY Left 9/13/2016    Procedure: TOTAL KNEE ARTHROPLASTY lc orthoalign;  Surgeon: Jerry Yarbrough MD;  Location: [facility];  Service: Orthopedics   • SINUS SURGERY     • TUBAL ABDOMINAL LIGATION       Family History   Problem Relation Age of Onset   • Hypertension Mother    • Hypertension Father    • Hypertension Sister    • Hypertension Brother      Social History   Substance Use Topics   • Smoking status: Never Smoker   • Smokeless tobacco: Never Used   • Alcohol use No     Prescriptions Prior to Admission   Medication Sig Dispense Refill Last Dose   • amLODIPine  (NORVASC) 5 MG tablet Take 5 mg by mouth 2 (Two) Times a Day.   4/4/2017 at 0700   • aspirin 81 MG EC tablet Take 81 mg by mouth Every Morning.   3/27/2017 at 0700   • lisinopril-hydrochlorothiazide (PRINZIDE,ZESTORETIC) 20-25 MG per tablet Take 1 tablet by mouth 2 (Two) Times a Day.   4/3/2017 at 0700   • metoprolol succinate XL (TOPROL-XL) 100 MG 24 hr tablet Take 100 mg by mouth 2 (Two) Times a Day.   4/4/2017 at 0700   • Multiple Vitamins-Minerals (MULTIVITAMIN WITH MINERALS) tablet tablet Take 1 tablet by mouth Every Morning.   4/3/2017 at 0700   • potassium chloride (K-DUR,KLOR-CON) 20 MEQ CR tablet Take 20 mEq by mouth 3 (Three) Times a Day.   4/3/2017 at 1900     Allergies:  Bee venom; Latex; and Penicillins    REVIEW OF SYSTEMS:  Please see the above history of present illness for pertinent positives and negatives.  The remainder of the patient's systems have been reviewed and are negative.     Vital Signs  Temp:  [97.5 °F (36.4 °C)-98.5 °F (36.9 °C)] 97.7 °F (36.5 °C)  Heart Rate:  [63-78] 70  Resp:  [15-20] 20  BP: ()/(52-73) 109/71  Oxygen Therapy  SpO2: 98 %  Pulse Oximetry Type: Continuous  O2 Device: nasal cannula  Flow (L/min): 2  EtCO2 (mm Hg) (Respiratory Monitoring): 42  There is no height or weight on file to calculate BMI.         Physical Exam:  Physical Exam   Constitutional: Patient appears well-developed, obese and in no acute distress   HEENT:   Head: Normocephalic and atraumatic.   Eyes:  Pupils are equal, round, and reactive to light. EOM are intact. Sclera are anicteric and non-injected.  Mouth and Throat: Patient has moist mucous membranes. Oropharynx is clear of any erythema or exudate.     Neck: Neck supple. No JVD noted (difficult to assess given patient's body habitus). No thyromegaly present. No lymphadenopathy present.  Cardiovascular: Regular rate, regular rhythm, S1 normal and S2 normal.  Exam reveals no gallop and no friction rub.  No murmur heard.  Pulmonary/Chest: Lungs  are clear to auscultation bilaterally. No respiratory distress. No wheezes. No rhonchi. No rales.   Abdominal: Obese. Soft. Bowel sounds are normal. No distension and no mass noted. There is no tenderness.   Musculoskeletal: Normal Muscle tone  Extremities: No edema. Pulses are palpable in all 4 extremities. Dressing and ice to RLE.  Neurological: Patient is alert and oriented to person, place, and time. Cranial nerves II-XII are grossly intact with no focal deficits.  Skin: Skin is warm. No rash noted. Nails show no clubbing.  No cyanosis or erythema.     Results Review:    I reviewed the patient's new clinical results.  Lab Results (most recent)     Procedure Component Value Units Date/Time    Potassium [[id number]]  (Normal) Collected:  04/04/17 1123    Specimen:  Blood Updated:  04/04/17 1141     Potassium 3.7 mmol/L           Imaging Results (most recent)     Procedure Component Value Units Date/Time    XR Knee 1 or 2 View Right [[id number]] Collected:  04/04/17 1628     Updated:  04/04/17 1632    Narrative:       EXAM: 2 views of the right knee.     DATE: 04/04/2017     HISTORY:: Right knee replacement today.     COMPARISON: 2 views the right knee 03/16/2017.     FINDINGS: Total right knee replacement changes are present. The  prosthesis appears appropriately seated. No periprosthetic fracture  is  seen. The knee joint appears appropriately aligned. Surgical drains in  place within the anterior soft tissues. Expected postoperative  subcutaneous air and soft tissue swelling.       Impression:       1. Satisfactory postoperative appearance status post right knee  replacement.     This report was finalized on 4/4/2017 4:29 PM by Dr. Ny Cameron MD.               ECG/EMG Results (most recent)     None            Assessment/Plan     1. End stage degenerative arthritis of the right knee s/p right TKA: Management per Dr. Yarbrough.  Patient on ASA for DVT prophy.  PT/OT ordered. Patient plans to go home, possibly  outpatient PT/OT.  No pain currently as block still in effect.    2. Hypertension: BP on low side post-op.  Will hold patient's lisinopril/HCTZ and norvasc for now, continue home metoprolol (confirmed with patient's pharmacy she takes tartrate not succinate).  Monitor, resume meds when BP will tolerate. Monitor.    3. Obesity: Nutrition to .    4. H/O Hypomagnesemia/Hypokalemia: continue patient's home potassium supplementation.  Check BMP and Mag in AM.    5. Chronic leukocytosis: Looking back in records has been present since our earliest labs in 8/2016 (between 11-14). She has never seen a hematologist but notes this has been present a long time. No acute infectious process suspected, unclear etiology.  Monitor post-op and F/U with PCP at discharge for further monitoring or W/U. Consider Hematology referral for further eval, lymphocytes intermittently elevated on differential.    6. DVT prophy: ASA per Ortho.    I discussed the patients findings and my recommendations with patient.     Janina Smallwood MD  04/04/17  6:08 PM

## 2017-04-04 NOTE — ANESTHESIA PREPROCEDURE EVALUATION
Anesthesia Evaluation     Patient summary reviewed and Nursing notes reviewed   NPO Status: > 8 hours   Airway   Mallampati: II  TM distance: >3 FB  Neck ROM: full  no difficulty expected  Dental - normal exam     Pulmonary - negative pulmonary ROS and normal exam    breath sounds clear to auscultation  Cardiovascular - normal exam    ECG reviewed  Patient on routine beta blocker and Beta blocker given within 24 hours of surgery    (+) hypertension well controlled,       Neuro/Psych- negative ROS  GI/Hepatic/Renal/Endo    (+) obesity,  chronic renal disease stones,     Musculoskeletal     Abdominal   (+) obese,    Substance History - negative use     OB/GYN          Other   (+) arthritis                                 Anesthesia Plan    ASA 2     regional and spinal   (RT ACB)  intravenous and inhalational induction   Anesthetic plan and risks discussed with patient.  Use of blood products discussed with patient  Consented to blood products.

## 2017-04-04 NOTE — PLAN OF CARE
Problem: Perioperative Period (Adult)  Goal: Signs and Symptoms of Listed Potential Problems Will be Absent or Manageable (Perioperative Period)  Outcome: Ongoing (interventions implemented as appropriate)    04/04/17 1149   Perioperative Period   Problems Assessed (Perioperative Period) pain   Problems Present (Perioperative Period) pain;physiologic stress response

## 2017-04-04 NOTE — PLAN OF CARE
Problem: Patient Care Overview (Adult)  Goal: Plan of Care Review  Outcome: Ongoing (interventions implemented as appropriate)    04/04/17 7703   Coping/Psychosocial Response Interventions   Plan Of Care Reviewed With patient   Patient Care Overview   Progress improving   Outcome Evaluation   Outcome Summary/Follow up Plan vss, waiting to go to floor

## 2017-04-04 NOTE — OP NOTE
Date of Operation:  04/04/2017    PREOPERATIVE DIAGNOSIS: End-stage degenerative arthritis, right knee.     POSTOPERATIVE DIAGNOSIS: End-stage degenerative arthritis, right knee.     PROCEDURE PERFORMED: Right total knee arthroplasty using Zay #4 cruciate-retaining femoral component, #4 tibial tray, 9 mm CS insert with #32 patella button with antibiotic cement and the OrthAlign navigational system.     SURGEON: Jerry Yarbrough MD     ASSISTANT:  KRISTAL Carrasco    ANESTHESIA: Spinal with adductor canal block.     TOURNIQUET TIME: 53 minutes.     ESTIMATED BLOOD LOSS: 100 mL.     DRAIN: Drain x1.     DESCRIPTION OF PROCEDURE: The patient was brought to the operating room and after satisfactory anesthesia was obtained a pneumonic tourniquet was placed around the right upper leg. The patient was given preoperatively 900 of Cleocin and 2 g of vancomycin, 1 g of IV Tylenol, and 1 g of tranexamic acid. After the tourniquet was applied a timeout was completed, identifying the patient and the procedure correctly. The leg was then prepped and after the prep had dried for 3 minutes it was draped in a sterile field in the usual manner. Timeout again was completed. The leg was then exsanguinated. The tourniquet was elevated to 250 mmHg. A midline incision was then made and full-thickness subcutaneous flaps were developed. A medial arthrotomy incision was carried out. The patella subluxed laterally and then the infrapatellar fat pad and anterior cruciate supracondylar fat pad were excised. The OrthAlign navigational system was then applied to the femur and after the appropriate navigational maneuvers were carried out the distal femoral cut was completed. The patient templated for a #4 femoral component. With the #4 jig in place, the anterior, posterior, and chamfer cuts were completed. Lateral and medial peripheral osteophytes were then removed and a trial reduction with the #4 femoral component showed excellent bone to  prosthesis contact. A posterior cruciate retractor was then inserted and the remainder of the menisci were removed. The OrthAlign tibial navigational system was then applied to the tibia, and again after the appropriate navigational maneuvers the proximal tibial cut was completed. Gap balancing testing was then noted to be symmetrical. Inspection of the posterior recess showed no further menisci. Then 40 mL of the Exparel solution, which was 20 of Exparel, 60 of 0.25% plain, and 60 of normal saline, was mixed and 40 again was injected in the posterior capsule. The patella was then cut for a #32 patella button. Keel cut was made in the tibia after trial reduction with the #4 tray with the #9 insert, and the drill holes were made in the femur. Bone was lavaged and packed dry while 2 packs of antibiotic cement were prepared. Once the cement was ready the #4 nonporous keel tibial tray was cemented into place. After complete seating and removal of the excess cement the 9 mm CS insert was placed over the tibial tray and then the #4 femoral component was cemented into place. After removal of the excess cement the knee was brought out to full extension and axially loaded while the #32 patella button was cemented into place. While the cement was hardening the remainder of the Exparel solution was injected in the deep and superficial tissues. Once the cement had hardened the tourniquet was released. The patient was given a 2nd gram of tranexamic acid. The knee was then lavaged with Betadine and normal saline. A Hemovac was placed in the deep recesses of the wound and after the hemostasis was obtained 1 g of vancomycin powder was placed in the deep and superficial tissues. The arthrotomy was closed with #2 Vicryl in corner stitches and a running #1 StrataFix subcutaneous was closed with 0 StrataFix and 2-0 StrataFix and the skin was closed with a Prineo Dermabond dressing. Sterile dressing was then applied to the wound and the  patient was transferred to recovery, having tolerated the procedure well.       JENNY Burnette  D:  04/04/2017 15:48:41   T:  04/04/2017 16:57:50   Job ID:  [id number]   Document ID:  [id number]  cc:

## 2017-04-04 NOTE — PLAN OF CARE
Problem: Patient Care Overview (Adult)  Goal: Adult Individualization and Mutuality  Outcome: Ongoing (interventions implemented as appropriate)    04/04/17 1148   Individualization   Patient Specific Preferences goes by abner   Patient Specific Goals go home thursday   Patient Specific Interventions pain control   Mutuality/Individual Preferences   What Anxieties, Fears or Concerns Do You Have About Your Health or Care? none    What Questions Do You Have About Your Health or Care? i am good   What Information Would Help Us Give You More Personalized Care? i had the left one done last year

## 2017-04-04 NOTE — ANESTHESIA POSTPROCEDURE EVALUATION
Patient: Liana Patel    Procedure Summary     Date Anesthesia Start Anesthesia Stop Room / Location    04/04/17 1400 1608 [facility]       Procedure Diagnosis Surgeon Provider    TOTAL KNEE ARTHROPLASTY, dorian platt (Right Knee) Primary osteoarthritis of right knee  (Primary osteoarthritis of right knee [M17.11]) MD Agnes Blankenship CRNA          Anesthesia Type: regional, spinal  Last vitals  BP      Temp      Pulse     Resp      SpO2        Post Anesthesia Care and Evaluation    Patient location during evaluation: bedside  Patient participation: complete - patient participated  Level of consciousness: awake and alert  Pain management: adequate  Airway patency: patent  Anesthetic complications: No anesthetic complications  PONV Status: controlled  Cardiovascular status: acceptable  Respiratory status: acceptable  Hydration status: acceptable

## 2017-04-04 NOTE — PLAN OF CARE
Problem: Perioperative Period (Adult)  Goal: Signs and Symptoms of Listed Potential Problems Will be Absent or Manageable (Perioperative Period)  Outcome: Ongoing (interventions implemented as appropriate)    04/04/17 6980   Perioperative Period   Problems Assessed (Perioperative Period) all   Problems Present (Perioperative Period) none

## 2017-04-04 NOTE — PLAN OF CARE
Problem: Patient Care Overview (Adult)  Goal: Plan of Care Review  Outcome: Ongoing (interventions implemented as appropriate)    04/04/17 1147   Coping/Psychosocial Response Interventions   Plan Of Care Reviewed With patient   Patient Care Overview   Progress improving   Outcome Evaluation   Outcome Summary/Follow up Plan vss ready for procedure

## 2017-04-04 NOTE — ANESTHESIA PROCEDURE NOTES
Spinal Block    Patient location during procedure: pre-op  Start Time: 4/4/2017 1:47 PM  Stop Time: 4/4/2017 1:55 PM  Indication:at surgeon's request and procedure for pain  Performed By  CRNA: OCTAVIO RUBIO  Preanesthetic Checklist  Completed: patient identified, surgical consent, pre-op evaluation, timeout performed, IV checked, risks and benefits discussed and monitors and equipment checked  Spinal Block Prep:  Patient Position:sitting  Sterile Tech:cap, gloves, mask and sterile barriers  Prep:Betadine and x3  Patient Monitoring:blood pressure monitoring, continuous pulse oximetry and EKG  Spinal Block Procedure  Approach:midline  Guidance:landmark technique and palpation technique  Location:L3-L4  Needle Type:Pencan  Needle Gauge:27 G  Placement of Spinal needle event:cerebrospinal fluid aspirated  Paresthesia: noPost Assessment  Patient Tolerance:patient tolerated the procedure well with no apparent complications  Complications no

## 2017-04-05 LAB
ANION GAP SERPL CALCULATED.3IONS-SCNC: 12.4 MMOL/L
APTT PPP: 28.2 SECONDS (ref 24.3–38.1)
BUN BLD-MCNC: 22 MG/DL (ref 8–23)
BUN/CREAT SERPL: 26.2 (ref 7–25)
CALCIUM SPEC-SCNC: 8 MG/DL (ref 8.8–10.5)
CHLORIDE SERPL-SCNC: 99 MMOL/L (ref 98–107)
CO2 SERPL-SCNC: 28.6 MMOL/L (ref 22–29)
CREAT BLD-MCNC: 0.84 MG/DL (ref 0.57–1)
DEPRECATED RDW RBC AUTO: 41.4 FL (ref 37–54)
ERYTHROCYTE [DISTWIDTH] IN BLOOD BY AUTOMATED COUNT: 13.2 % (ref 11.5–14.5)
GFR SERPL CREATININE-BSD FRML MDRD: 69 ML/MIN/1.73
GLUCOSE BLD-MCNC: 133 MG/DL (ref 65–99)
HCT VFR BLD AUTO: 34.7 % (ref 37–47)
HGB BLD-MCNC: 11.6 G/DL (ref 12–16)
LYMPHOCYTES # BLD MANUAL: 3.28 10*3/MM3 (ref 0.6–4.8)
LYMPHOCYTES NFR BLD MANUAL: 16 % (ref 20–45)
LYMPHOCYTES NFR BLD MANUAL: 2 % (ref 3–8)
MAGNESIUM SERPL-MCNC: 1.6 MG/DL (ref 1.7–2.5)
MCH RBC QN AUTO: 29.1 PG (ref 27–31)
MCHC RBC AUTO-ENTMCNC: 33.4 G/DL (ref 31–37)
MCV RBC AUTO: 87 FL (ref 81–99)
MONOCYTES # BLD AUTO: 0.41 10*3/MM3 (ref 0–1)
NEUTROPHILS # BLD AUTO: 16.83 10*3/MM3 (ref 1.5–8.3)
NEUTROPHILS NFR BLD MANUAL: 75 % (ref 45–70)
NEUTS BAND NFR BLD MANUAL: 7 % (ref 0–5)
PLATELET # BLD AUTO: 220 10*3/MM3 (ref 140–500)
PMV BLD AUTO: 9.7 FL (ref 7.4–10.4)
POTASSIUM BLD-SCNC: 4.1 MMOL/L (ref 3.5–5.2)
RBC # BLD AUTO: 3.99 10*6/MM3 (ref 4.2–5.4)
RBC MORPH BLD: NORMAL
SMALL PLATELETS BLD QL SMEAR: ADEQUATE
SODIUM BLD-SCNC: 140 MMOL/L (ref 136–145)
WBC MORPH BLD: NORMAL
WBC NRBC COR # BLD: 20.53 10*3/MM3 (ref 4.8–10.8)

## 2017-04-05 PROCEDURE — 85025 COMPLETE CBC W/AUTO DIFF WBC: CPT | Performed by: ORTHOPAEDIC SURGERY

## 2017-04-05 PROCEDURE — 25010000002 VANCOMYCIN 1500 MG/250ML SOLUTION: Performed by: ORTHOPAEDIC SURGERY

## 2017-04-05 PROCEDURE — 97116 GAIT TRAINING THERAPY: CPT

## 2017-04-05 PROCEDURE — 97161 PT EVAL LOW COMPLEX 20 MIN: CPT

## 2017-04-05 PROCEDURE — 85007 BL SMEAR W/DIFF WBC COUNT: CPT | Performed by: ORTHOPAEDIC SURGERY

## 2017-04-05 PROCEDURE — 85730 THROMBOPLASTIN TIME PARTIAL: CPT | Performed by: ORTHOPAEDIC SURGERY

## 2017-04-05 PROCEDURE — 80048 BASIC METABOLIC PNL TOTAL CA: CPT | Performed by: HOSPITALIST

## 2017-04-05 PROCEDURE — 99024 POSTOP FOLLOW-UP VISIT: CPT | Performed by: ORTHOPAEDIC SURGERY

## 2017-04-05 PROCEDURE — 99232 SBSQ HOSP IP/OBS MODERATE 35: CPT | Performed by: NURSE PRACTITIONER

## 2017-04-05 PROCEDURE — 25010000002 MAGNESIUM SULFATE 2 GM/50ML SOLUTION: Performed by: NURSE PRACTITIONER

## 2017-04-05 PROCEDURE — 97110 THERAPEUTIC EXERCISES: CPT

## 2017-04-05 PROCEDURE — 94799 UNLISTED PULMONARY SVC/PX: CPT

## 2017-04-05 PROCEDURE — 83735 ASSAY OF MAGNESIUM: CPT | Performed by: HOSPITALIST

## 2017-04-05 PROCEDURE — 97165 OT EVAL LOW COMPLEX 30 MIN: CPT

## 2017-04-05 RX ORDER — MAGNESIUM SULFATE HEPTAHYDRATE 40 MG/ML
2 INJECTION, SOLUTION INTRAVENOUS ONCE
Status: COMPLETED | OUTPATIENT
Start: 2017-04-05 | End: 2017-04-05

## 2017-04-05 RX ADMIN — DOCUSATE SODIUM AND SENNOSIDES 2 TABLET: 8.6; 5 TABLET, FILM COATED ORAL at 17:29

## 2017-04-05 RX ADMIN — VANCOMYCIN HCL-SODIUM CHLORIDE IV SOLN 1.5 GM/250ML-0.9% 1500 MG: 1.5-0.9/25 SOLUTION at 04:26

## 2017-04-05 RX ADMIN — AMLODIPINE BESYLATE 5 MG: 5 TABLET ORAL at 10:00

## 2017-04-05 RX ADMIN — METOPROLOL TARTRATE 100 MG: 50 TABLET, FILM COATED ORAL at 09:02

## 2017-04-05 RX ADMIN — OXYCODONE HYDROCHLORIDE 10 MG: 5 TABLET ORAL at 17:28

## 2017-04-05 RX ADMIN — CELECOXIB 200 MG: 200 CAPSULE ORAL at 10:00

## 2017-04-05 RX ADMIN — DOCUSATE SODIUM AND SENNOSIDES 2 TABLET: 8.6; 5 TABLET, FILM COATED ORAL at 09:02

## 2017-04-05 RX ADMIN — CLINDAMYCIN PHOSPHATE 900 MG: 18 INJECTION, SOLUTION INTRAVENOUS at 05:53

## 2017-04-05 RX ADMIN — ACETAMINOPHEN 1000 MG: 500 TABLET ORAL at 00:17

## 2017-04-05 RX ADMIN — OXYCODONE HYDROCHLORIDE 10 MG: 5 TABLET ORAL at 11:53

## 2017-04-05 RX ADMIN — PREGABALIN 75 MG: 75 CAPSULE ORAL at 20:18

## 2017-04-05 RX ADMIN — ACETAMINOPHEN 1000 MG: 500 TABLET ORAL at 17:28

## 2017-04-05 RX ADMIN — ASPIRIN 325 MG: 325 TABLET ORAL at 10:00

## 2017-04-05 RX ADMIN — OXYCODONE HYDROCHLORIDE 10 MG: 5 TABLET ORAL at 22:32

## 2017-04-05 RX ADMIN — POTASSIUM CHLORIDE 20 MEQ: 20 TABLET, EXTENDED RELEASE ORAL at 20:18

## 2017-04-05 RX ADMIN — POTASSIUM CHLORIDE 20 MEQ: 20 TABLET, EXTENDED RELEASE ORAL at 09:02

## 2017-04-05 RX ADMIN — ACETAMINOPHEN 1000 MG: 500 TABLET ORAL at 11:54

## 2017-04-05 RX ADMIN — ACETAMINOPHEN 1000 MG: 500 TABLET ORAL at 05:53

## 2017-04-05 RX ADMIN — PREGABALIN 75 MG: 75 CAPSULE ORAL at 09:00

## 2017-04-05 RX ADMIN — POTASSIUM CHLORIDE 20 MEQ: 20 TABLET, EXTENDED RELEASE ORAL at 15:58

## 2017-04-05 RX ADMIN — ASPIRIN 325 MG: 325 TABLET ORAL at 17:29

## 2017-04-05 RX ADMIN — METOPROLOL TARTRATE 100 MG: 50 TABLET, FILM COATED ORAL at 20:18

## 2017-04-05 RX ADMIN — MAGNESIUM SULFATE HEPTAHYDRATE 2 G: 40 INJECTION, SOLUTION INTRAVENOUS at 12:28

## 2017-04-05 NOTE — THERAPY DISCHARGE NOTE
Acute Care - Occupational Therapy Initial Eval/Discharge   Brandee Cartwright     Patient Name: Liana Patel  : 1955  MRN: [id number]  Today's Date: 2017  Onset of Illness/Injury or Date of Surgery Date: 17  Date of Referral to OT: 17  Referring Physician: Dr Yarbrough      Admit Date: 2017       ICD-10-CM ICD-9-CM   1. Primary osteoarthritis of right knee M17.11 715.16     Patient Active Problem List   Diagnosis   • Left knee DJD   • Osteoarthritis of right knee     Past Medical History:   Diagnosis Date   • Arthritis    • Hypertension    • Kidney stones    • Left knee DJD     KNEE REPLACED   • OA (osteoarthritis)    • Right knee pain     SCHEDULED FOR REPLACEMENT     Past Surgical History:   Procedure Laterality Date   • APPENDECTOMY     • CHOLECYSTECTOMY     • [place] TOTAL KNEE ARTHROPLASTY Left 2016    Procedure: TOTAL KNEE ARTHROPLASTY lc orthoalign;  Surgeon: Jerry Yarbrough MD;  Location: [facility];  Service: Orthopedics   • SINUS SURGERY     • TUBAL ABDOMINAL LIGATION            OT ASSESSMENT FLOWSHEET (last 72 hours)      OT Evaluation       17 1333 17 0938 17 0929 17 0928 17 0800    Rehab Evaluation    Document Type   evaluation  -JJ evaluation  -JW     Subjective Information   agree to therapy;complains of;pain  -JJ agree to therapy;complains of;pain  -JW     Patient Effort, Rehab Treatment   excellent  -JJ excellent  -JW     Symptoms Noted During/After Treatment   increased pain  -JJ increased pain  -JW     General Information    Patient Profile Review   yes  -JJ yes  -JW     Onset of Illness/Injury or Date of Surgery Date   17  -JJ 17  -JW     Referring Physician   Dr Yarbrough  -JUAN DAVID Yarbrough  -AMADO     General Observations   pt reclined in chair, agreed to evaluation  -JJ pt sitting in chair, agreeable to evaluation  -JW     Pertinent History Of Current Problem   pt with worsening R knee pain. pt sp R TKA  -JJ pt with worsening right knee pain.   pt s/p right TKA  -JW     Precautions/Limitations   fall precautions  -JJ fall precautions  -JW     Prior Level of Function   independent:;all household mobility;community mobility;ADL's  -JJ independent:;all household mobility;community mobility;ADL's  -JW     Equipment Currently Used at Home  none   has a rolling walker, BSC and cane  -AP --   pt owns RW, BSC, cane  -JJ other (see comments)   pt owns BSC, RW, cane  -JW     Plans/Goals Discussed With   patient;agreed upon  -JJ patient;agreed upon  -JW     Risks Reviewed   patient:;increased discomfort  -JJ patient:;increased discomfort  -JW     Benefits Reviewed   patient:;improve function;increase independence  -JJ patient:;improve function;increase independence;increase strength;increase balance;decrease pain  -JW     Barriers to Rehab   none identified  -JJ none identified  -JW     Living Environment    Lives With  child(john), adult;parent(s);other relative(s) (specify)   son, daughter in law, 2 grandchildren and her mother  -AP child(john), adult;parent(s)   son and daughter in law  -JJ child(john), adult;parent(s)   son and daughter in law  -JW     Living Arrangements  house  -AP house  -JJ house  -JW     Home Accessibility   bed and bath on same level;stairs to enter home  -JJ bed and bath on same level;stairs to enter home  -JW     Number of Stairs to Enter Home   1  -JJ 1  -JW     Stair Railings at Home   outside, present on right side  -JJ outside, present on right side  -JW     Transportation Available  family or friend will provide  -AP       Clinical Impression    Date of Referral to OT   04/05/17  -JJ      OT Diagnosis   decreased adl sp R TKR  -JJ      Patient/Family Goals Statement   pt states going home with outpatient therapy  -J      Criteria for Skilled Therapeutic Interventions Met   no;no problems identified which require skilled intervention  -JJ      Therapy Frequency   evaluation only  -J      Anticipated Equipment Needs At Discharge   --    provided pt with long handled ae and educated on use  -      Anticipated Discharge Disposition home with assist;home with outpatient services  -  home with outpatient services;home with assist  -      Pain Assessment    Pain Assessment   0-10  - 0-10  -     Pain Score   0  - 0  -JW     Post Pain Score   3  -J 3  -JW     Pain Type   Acute pain;Surgical pain  - Acute pain;Surgical pain  -     Pain Location   Knee  -JJ Knee  -JW     Pain Orientation   Right  -JJ Right  -JW     Pain Intervention(s)   Repositioned;Cold applied  -JJ Cold applied;Repositioned  -JW     Response to Interventions   tolerated  -JJ      Cognitive Assessment/Intervention    Current Cognitive/Communication Assessment   functional  -JJ functional  -JW     Orientation Status   oriented x 4  -JJ oriented x 4  -JW     Follows Commands/Answers Questions   100% of the time;able to follow single-step instructions  - 100% of the time;able to follow single-step instructions  -     Personal Safety   WNL/WFL  -JJ WNL/WFL  -JW     Personal Safety Interventions   gait belt;nonskid shoes/slippers when out of bed  - gait belt;nonskid shoes/slippers when out of bed  -     ROM (Range of Motion)    General ROM    other (see comments)   left LE ROM WFL, right hip/knee approx 50% functional  -     General ROM Detail   B UE AROM WFl  -JJ      MMT (Manual Muscle Testing)    General MMT Assessment    other (see comments)   left LE strength WFL, right hip/knee 4-/5  -JW     General MMT Assessment Detail   B UE MMT WFL  -JJ      Muscle Tone Assessment    Muscle Tone Assessment     RLE  -SW    RLE Muscle Tone Assessment     mildly decreased tone  -    Bed Mobility, Assessment/Treatment    Bed Mobility, Comment   deferred up in chair  -JJ deferred-up in chair  -     Transfer Assessment/Treatment    Transfers, Sit-Stand [place]   contact guard assist;verbal cues required  - contact guard assist;verbal cues required  -     Transfers,  Stand-Sit [place]   contact guard assist;verbal cues required  - contact guard assist;verbal cues required  -     Transfers, Sit-Stand-Sit, Assist Device   rolling walker  - rolling walker  -     Transfer, Comment   pt requires verbal cues for hand placement  - pt requires verbal cues for proper hand placement and sequencing  -     Functional Mobility    Functional Mobility- Ind. Level   contact guard assist  -      Functional Mobility- Device   rolling walker  -      Functional Mobility-Distance (Feet)   --   in hallway  -      Upper Body Bathing Assessment/Training    UB Bathing Assess/Train, Comment   pt sba for LB adls with use of ae  -J      Therapy Exercises    Right Lower Extremity    AROM:;10 reps;sitting;ankle pumps/circles;glut sets;heel raises;hip abduction/adduction;hip flexion;LAQ;quad sets;SLR;supine  -     Sensory Assessment/Intervention    Sensory Impairment   --   pt reports no numbness in R LE  -JJ --   pt reports no numbness in LEs  -JW     Positioning and Restraints    Pre-Treatment Position   sitting in chair/recliner  -JJ sitting in chair/recliner  -JW     Post Treatment Position   chair  -JJ chair  -JW     In Chair   reclined;call light within reach;with PT  -JJ reclined;call light within reach;encouraged to call for assist;with OT;notified nsg  -       04/04/17 1800 04/04/17 1104             General Information    Equipment Currently Used at Home none  -CT        Living Environment    Lives With  child(john), adult;parent(s)  -PJ       Living Arrangements  house  -PJ       Home Accessibility  bed and bath on same level  -PJ       Stair Railings at Home  outside, present on left side  -PJ       Type of Financial/Environmental Concern  none  -PJ       Transportation Available  car  -PJ       Functional Level Prior    Ambulation 0-->independent  -CT        Transferring 0-->independent  -CT        Toileting 0-->independent  -CT        Bathing 0-->independent  -CT         Dressing 0-->independent  -CT        Eating 0-->independent  -CT        Communication 0-->understands/communicates without difficulty  -CT        Swallowing 0-->swallows foods/liquids without difficulty  -CT        Prior Functional Level Comment independent  -CT          User Key  (r) = Recorded By, (t) = Taken By, (c) = Cosigned By    Initials Name Effective Dates    ESTHER Chávezlis Tiffany Bautista, RN 06/16/16 -     JUAN DAVID Soto, OTR 06/22/16 -     AP Leonor Bergman, RN 12/01/15 -     SW Fang Brown, WILDA 06/16/16 -     JW Yissel Tran, PT 12/01/15 -     CT Angelina King RN 06/16/16 -           Occupational Therapy Education     Title: PT OT SLP Therapies (Done)     Topic: Occupational Therapy (Resolved)     Point: ADL training (Resolved)    Description: Instruct learner(s) on proper safety adaptation and remediation techniques during self care or transfers.   Instruct in proper use of assistive devices.    Learning Progress Summary    Learner Readiness Method Response Comment Documented by Status   Patient Acceptance E TO,JOHNSON pt educated on adls, long handled ae, and safety with functional mobility and transfers  04/05/17 1329 Done               Point: Body mechanics (Resolved)    Description: Instruct learner(s) on proper positioning and spine alignment during self-care, functional mobility activities and/or exercises.    Learning Progress Summary    Learner Readiness Method Response Comment Documented by Status   Patient Acceptance E VU,DU pt educated on adls, long handled ae, and safety with functional mobility and transfers  04/05/17 1329 Done                      User Key     Initials Effective Dates Name Provider Type Discipline     06/22/16 -  Hiral Soto, OTR Occupational Therapist OT                OT Recommendation and Plan  Anticipated Equipment Needs At Discharge:  (provided pt with long handled ae and educated on use)  Anticipated Discharge Disposition: home with  assist, home with outpatient services  Therapy Frequency: evaluation only  Plan of Care Review  Plan Of Care Reviewed With: patient  Outcome Summary/Follow up Plan: OT evaluation completed. pt cga for functional trnasfers and mobility with rolling walker. pt sba for adls with use of long handled ae. pt used ae for previous knee sx and has no concerns with using again. pt states no other OT concerns at this time and is going home with family assist if needed and continuing therapy with out pt services               Outcome Measures       04/05/17 0929 04/05/17 0928       How much help from another person do you currently need...    Turning from your back to your side while in flat bed without using bedrails?  3  -JW     Moving from lying on back to sitting on the side of a flat bed without bedrails?  3  -JW     Moving to and from a bed to a chair (including a wheelchair)?  3  -JW     Standing up from a chair using your arms (e.g., wheelchair, bedside chair)?  3  -JW     Climbing 3-5 steps with a railing?  3  -JW     To walk in hospital room?  3  -JW     AM-PAC 6 Clicks Score  18  -JW     How much help from another is currently needed...    Putting on and taking off regular lower body clothing? 3  -JJ      Bathing (including washing, rinsing, and drying) 3  -JJ      Toileting (which includes using toilet bed pan or urinal) 4  -JJ      Putting on and taking off regular upper body clothing 4  -JJ      Taking care of personal grooming (such as brushing teeth) 4  -JJ      Eating meals 4  -JJ      Score 22  -JJ      Functional Assessment    Outcome Measure Options AM-PAC 6 Clicks Daily Activity (OT)  -J AM-PAC 6 Clicks Basic Mobility (PT)  -JW       User Key  (r) = Recorded By, (t) = Taken By, (c) = Cosigned By    Initials Name Provider Type    JUAN DAVID Soto, OTR Occupational Therapist    AMADO Tran, PT Physical Therapist          Time Calculation:         Time Calculation- OT       04/05/17 2856           Time Calculation- OT    OT Start Time 0929  -JUAN DAVID        User Key  (r) = Recorded By, (t) = Taken By, (c) = Cosigned By    Initials Name Provider Type    JUAN DAVID Soto, OTROXANNA Occupational Therapist          Therapy Charges for Today     Code Description Service Date Service Provider Modifiers Qty    [id number]  OT EVAL LOW COMPLEXITY 3 4/5/2017 Hiral Soto, OTR GO 1               OT Discharge Summary  Anticipated Discharge Disposition: home with assist, home with outpatient services    ELLIE Kwon  4/5/2017

## 2017-04-05 NOTE — PLAN OF CARE
Problem: Patient Care Overview (Adult)  Goal: Plan of Care Review  Outcome: Ongoing (interventions implemented as appropriate)    04/04/17 2922   Coping/Psychosocial Response Interventions   Plan Of Care Reviewed With patient   Patient Care Overview   Progress improving   Outcome Evaluation   Outcome Summary/Follow up Plan vss, waiting to go to floor

## 2017-04-05 NOTE — NURSING NOTE
Discharge Planning Assessment  SANGITA Hobbs     Patient Name: Liana Patel  MRN: [id number]  Today's Date: 4/5/2017    Admit Date: 4/4/2017          Discharge Needs Assessment       04/05/17 0938    Living Environment    Lives With child(john), adult;parent(s);other relative(s) (specify)   son, daughter in law, 2 grandchildren and her mother    Living Arrangements house    Provides Primary Care For no one    Quality Of Family Relationships supportive;helpful;involved    Able to Return to Prior Living Arrangements yes    Living Arrangement Comments patient lives with son, daughter in law, 2 grandchildren and her mother.     Discharge Needs Assessment    Concerns To Be Addressed discharge planning concerns    Readmission Within The Last 30 Days no previous admission in last 30 days    Outpatient/Agency/Support Group Needs --   used [facility] for outpatient PT in the past    Anticipated Changes Related to Illness --   will continue to assess    Equipment Currently Used at Home none   has a rolling walker, BSC and cane    Equipment Needed After Discharge --   will continue to assess    Transportation Available family or friend will provide    Discharge Planning Comments spoke with patient at bedside. patient lives with son, daughter in law, 2 grandchildren and her mother. [place]. she has not used HH or home O2 in the past. has a rolling walker, BSC and cane. independent with ADL's prior to surgery and also driving. uses [facility] and denies having difficulty paying for medications. does not have a living will. patient interested so spiritual consult placed. plan is home with outpatient PT. she stated she has used [facility] in the past but is interested in [facility] outpatient PT. she said the last time she had surgery she was able to do her exercises at home because of schedulitng conflict with appt times. she states she has not used home health in the past because  of her driveway, someone would need a 4x4 vehicle. will continue to follow.             Discharge Plan       04/05/17 0949    Case Management/Social Work Plan    Plan plan is home with outpatient PT.     Patient/Family In Agreement With Plan yes    Additional Comments spoke with patient at bedside. patient lives with son, daughter in law, 2 grandchildren and her mother. they [place]. she has not used HH or home O2 in the past. has a rolling walker, BSC and cane. independent with ADL's prior to surgery and also driving. uses [facility] and denies having difficulty paying for medications. does not have a living will. patient interested so spiritual consult placed. plan is home with outpatient PT. she stated she has used [facility] in the past but is interested in [facility] outpatient PT. she said the last time she had surgery she was able to do her exercises at home because of schedulitng conflict with appt times. she states she has not used home health in the past because of her driveway, someone would need a 4x4 vehicle. will continue to follow.         Discharge Placement     No information found                Demographic Summary       04/05/17 0952    Referral Information    Admission Type inpatient    Referral Source admission list    Reason For Consult discharge planning    Record Reviewed medical record    Contact Information    Permission Granted to Share Information With             Functional Status     None            Psychosocial     None            Abuse/Neglect     None            Legal     None            Substance Abuse     None            Patient Forms     None          Leonor Bergman RN

## 2017-04-05 NOTE — PLAN OF CARE
Problem: Patient Care Overview (Adult)  Goal: Plan of Care Review    04/05/17 1430   Coping/Psychosocial Response Interventions   Plan Of Care Reviewed With patient   Patient Care Overview   Progress progress toward functional goals as expected   Outcome Evaluation   Outcome Summary/Follow up Plan PT: Patient with improved gait distance this afternoon and required decreased assist for functional mobility. Patient demonstrates improved gait mechanics and speed with use of RW. Overall progressing with physical therapy.

## 2017-04-05 NOTE — PROGRESS NOTES
Acute Care - Physical Therapy Initial Evaluation   Brandee Cartwright     Patient Name: Liana Patel  : 1955  MRN: [id number]  Today's Date: 2017   Onset of Illness/Injury or Date of Surgery Date: 17  Date of Referral to PT: 17  Referring Physician: Dr Yarbrough      Admit Date: 2017     Visit Dx:    ICD-10-CM ICD-9-CM   1. Primary osteoarthritis of right knee M17.11 715.16     Patient Active Problem List   Diagnosis   • Left knee DJD   • Osteoarthritis of right knee     Past Medical History:   Diagnosis Date   • Arthritis    • Hypertension    • Kidney stones    • Left knee DJD     KNEE REPLACED   • OA (osteoarthritis)    • Right knee pain     SCHEDULED FOR REPLACEMENT     Past Surgical History:   Procedure Laterality Date   • APPENDECTOMY     • CHOLECYSTECTOMY     • UT TOTAL KNEE ARTHROPLASTY Left 2016    Procedure: TOTAL KNEE ARTHROPLASTY lc orthoalign;  Surgeon: Jerry Yarbrough MD;  Location: [facility];  Service: Orthopedics   • SINUS SURGERY     • TUBAL ABDOMINAL LIGATION            PT ASSESSMENT (last 72 hours)      PT Evaluation       17 0928     Rehab Evaluation    Document Type evaluation  -JW    Subjective Information agree to therapy;complains of;pain  -JW    Patient Effort, Rehab Treatment excellent  -JW    Symptoms Noted During/After Treatment increased pain  -JW    General Information    Patient Profile Review yes  -JW    Onset of Illness/Injury or Date of Surgery Date 17  -JW    Referring Physician Dr Yarbrough  -JW    General Observations pt sitting in chair, agreeable to evaluation  -JW    Pertinent History Of Current Problem pt with worsening right knee pain.  pt s/p right TKA  -JW    Precautions/Limitations fall precautions  -JW    Prior Level of Function independent:;all household mobility;community mobility;ADL's  -JW    Equipment Currently Used at Home other (see comments)   pt owns BSC, RW, cane  -JW    Plans/Goals Discussed With patient;agreed upon  -JW     Risks Reviewed patient:;increased discomfort  -JW    Benefits Reviewed patient:;improve function;increase independence;increase strength;increase balance;decrease pain  -JW    Barriers to Rehab none identified  -JW    Living Environment    Lives With child(john), adult;parent(s)   son and daughter in law  -JW    Living Arrangements house  -JW    Home Accessibility bed and bath on same level;stairs to enter home  -JW    Number of Stairs to Enter Home 1  -JW    Stair Railings at Home outside, present on right side  -JW    Transportation Available     Clinical Impression    Date of Referral to PT 04/04/17  -JW    Patient/Family Goals Statement return home  -JW    Criteria for Skilled Therapeutic Interventions Met yes;treatment indicated  -JW    Rehab Potential good, to achieve stated therapy goals  -JW    Predicted Duration of Therapy Intervention (days/wks) 3 days  -JW    Pain Assessment    Pain Assessment 0-10  -JW    Pain Score 0  -JW    Post Pain Score 3  -JW    Pain Type Acute pain;Surgical pain  -JW    Pain Location Knee  -JW    Pain Orientation Right  -JW    Pain Intervention(s) Cold applied;Repositioned  -JW    Cognitive Assessment/Intervention    Current Cognitive/Communication Assessment functional  -JW    Orientation Status oriented x 4  -JW    Follows Commands/Answers Questions 100% of the time;able to follow single-step instructions  -    Personal Safety WNL/WFL  -JW    Personal Safety Interventions gait belt;nonskid shoes/slippers when out of bed  -JW    ROM (Range of Motion)    General ROM other (see comments)   left LE ROM WFL, right hip/knee approx 50% functional  -JW    MMT (Manual Muscle Testing)    General MMT Assessment other (see comments)   left LE strength WFL, right hip/knee 4-/5  -JW    Bed Mobility, Assessment/Treatment    Bed Mobility, Comment deferred-up in chair  -JW    Transfer Assessment/Treatment    Transfers, Sit-Stand [place] contact guard assist;verbal cues required  -     Transfers, Stand-Sit [place] contact guard assist;verbal cues required  -    Transfers, Sit-Stand-Sit, Assist Device rolling walker  -    Transfer, Comment pt requires verbal cues for proper hand placement and sequencing  -    Gait Assessment/Treatment    Gait, [place] Level contact guard assist  -    Gait, Assistive Device rolling walker  -    Gait, Distance (Feet) 150  -    Gait, Gait Pattern Analysis swing-through gait  -    Gait, Gait Deviations ligia decreased;right:;decreased heel strike;antalgic;left:;step length decreased  -    Gait, Safety Issues step length decreased;balance decreased during turns  -    Gait, Comment pt requires verbal cues for proper sequencing and use of walker.  Patient requires cues for upright posture  -    Therapy Exercises    Right Lower Extremity AROM:;10 reps;sitting;ankle pumps/circles;glut sets;heel raises;hip abduction/adduction;hip flexion;LAQ;quad sets;SLR;supine  -    Sensory Assessment/Intervention    Sensory Impairment --   pt reports no numbness in LEs  -JW    Positioning and Restraints    Pre-Treatment Position sitting in chair/recliner  -    Post Treatment Position chair  -JW    In Chair reclined;call light within reach;encouraged to call for assist;with OT;notified [facility]  -            User Key  (r) = Recorded By, (t) = Taken By, (c) = Cosigned By    Initials Name Provider Type    ESTHER Bautista, WILDA Registered Nurse    BERNA Bergman, WILDA Case Manager    AMADO Tran, PT Physical Therapist    LAN King RN Registered Nurse          Physical Therapy Education     Title: PT OT SLP Therapies (Done)     Topic: Physical Therapy (Done)     Point: Mobility training (Done)    Learning Progress Summary    Learner Readiness Method Response Comment Documented by Status   Patient Acceptance E TO   04/05/17 1118 Done               Point: Home exercise program (Done)    Learning Progress Summary    Learner  Readiness Method Response Comment Documented by Status   Patient Acceptance E VU  JW 04/05/17 1118 Done                      User Key     Initials Effective Dates Name Provider Type Discipline     12/01/15 -  Yissel Tran, PT Physical Therapist PT                PT Recommendation and Plan  Anticipated Discharge Disposition: home with outpatient services  Planned Therapy Interventions: balance training, bed mobility training, gait training, home exercise program, strengthening, transfer training, stair training  PT Frequency: 2 times/day  Plan of Care Review  Plan Of Care Reviewed With: patient  Outcome Summary/Follow up Plan: Physical therapy evaluation complete.  Patient requires CGA for transfers and gait with rolling walker x150 feet, CGA.  Patient able to complete SLR x10, knee immobilizer discontinued.  Patient will  benefit from physical therapy to address deficits in functional mobility, ROM and strength.  Recommend home with outpatient PT services.          IP PT Goals       04/05/17 1118          Bed Mobility PT STG    Bed Mobility PT STG, Date Established 04/05/17  -      Bed Mobility PT STG, Time to Achieve 3 days  -JW      Bed Mobility PT STG, Activity Type all bed mobility  -JW      Bed Mobility PT STG, [place] Level supervision required  -JW      Transfer Training PT STG    Transfer Training PT STG, Date Established 04/05/17  -JW      Transfer Training PT STG, Time to Achieve 3 days  -JW      Transfer Training PT STG, Activity Type all transfers  -JW      Transfer Training PT STG, [place] Level supervision required  -JW      Transfer Training PT STG, Assist Device walker, rolling  -JW      Gait Training PT STG    Gait Training Goal PT STG, Date Established 04/05/17  -JW      Gait Training Goal PT STG, Time to Achieve 3 days  -JW      Gait Training Goal PT STG, [place] Level supervision required  -JW      Gait Training Goal PT STG, Assist Device walker, rolling  -JW      Gait  Training Goal PT STG, Distance to Achieve 200  -      Stair Training PT STG    Stair Training Goal PT STG, Date Established 04/05/17  -JW      Stair Training Goal PT STG, Time to Achieve 3 days  -JW      Stair Training Goal PT STG, Number of Steps 1  -JW      Stair Training Goal PT STG, [place] Level contact guard assist  -JW      Stair Training Goal PT STG, Assist Device 1 handrail  -JW      Patient Education PT STG    Patient Education PT STG, Date Established 04/05/17  -      Patient Education PT STG, Time to Achieve 3 days  -JW      Patient Education PT STG, Education Type written program;HEP  -JW      Patient Education PT STG, Education Understanding demonstrate adequately;verbalize understanding  -JW        User Key  (r) = Recorded By, (t) = Taken By, (c) = Cosigned By    Initials Name Provider Type    AMADO Tran PT Physical Therapist                Outcome Measures       04/05/17 0928          How much help from another person do you currently need...    Turning from your back to your side while in flat bed without using bedrails? 3  -JW      Moving from lying on back to sitting on the side of a flat bed without bedrails? 3  -JW      Moving to and from a bed to a chair (including a wheelchair)? 3  -JW      Standing up from a chair using your arms (e.g., wheelchair, bedside chair)? 3  -JW      Climbing 3-5 steps with a railing? 3  -JW      To walk in hospital room? 3  -JW      AM-PAC 6 Clicks Score 18  -      Functional Assessment    Outcome Measure Options AM-PAC 6 Clicks Basic Mobility (PT)  -JW        User Key  (r) = Recorded By, (t) = Taken By, (c) = Cosigned By    Initials Name Provider Type    AMADO Tran PT Physical Therapist           Time Calculation:         PT Charges       04/05/17 1121          Time Calculation    Start Time 0928  -JW      PT Received On 04/05/17  -AMADO      PT - Next Appointment 04/05/17  -AMADO        User Key  (r) = Recorded By, (t) = Taken By, (c) =  Cosigned By    Initials Name Provider Type    JW Yissel Tran, PT Physical Therapist          Therapy Charges for Today     Code Description Service Date Service Provider Modifiers Qty    67500354295 HC PT EVAL LOW COMPLEXITY 3 4/5/2017 Yissel Tran, PT GP 1          PT G-Codes  Outcome Measure Options: AM-PAC 6 Clicks Basic Mobility (PT)      Yissel Tran, PT  4/5/2017

## 2017-04-05 NOTE — PROGRESS NOTES
Orthopedic Progress Note   Chief Complaint:  Status post right total knee    Subjective     Interval History: Patient is postop day 1 the above-stated procedure doing well.  She is afebrile hemodynamically stable.  Hemoglobin is 11.6.  He is going at 45 cc in the bag drainage.  Pain well-controlled oral medication.          Objective     Vital Signs  Temp:  [97.5 °F (36.4 °C)-98.5 °F (36.9 °C)] 98.1 °F (36.7 °C)  Heart Rate:  [63-78] 77  Resp:  [15-20] 18  BP: ()/(52-73) 111/67  Body mass index is 39.65 kg/(m^2).    Intake/Output Summary (Last 24 hours) at 04/05/17 0706  Last data filed at 04/05/17 0600   Gross per 24 hour   Intake             2290 ml   Output               45 ml   Net             2245 ml             Physical Exam:   General: patient awake, alert and cooperative   Cardiovascular: regular rhythm and rate   Pulm: clear to auscultation bilaterally   Abdomen: Benign.  Soft bowel sounds   Extremities: Good distal pulses no motor deficit.  Dressing is dry.   Neurologic: Normal mood and behavior     Results Review:     I reviewed the patient's new clinical results.      WBC No results found for: WBCS   HGB Hemoglobin   Date Value Ref Range Status   04/05/2017 11.6 (L) 12.0 - 16.0 g/dL Final      HCT Hematocrit   Date Value Ref Range Status   04/05/2017 34.7 (L) 37.0 - 47.0 % Final      Platlets No results found for: LABPLAT     PT/INR:  No results found for: PROTIME/No results found for: INR    Sodium Sodium   Date Value Ref Range Status   04/05/2017 140 136 - 145 mmol/L Final      Potassium Potassium   Date Value Ref Range Status   04/05/2017 4.1 3.5 - 5.2 mmol/L Final   04/04/2017 3.7 3.5 - 5.2 mmol/L Final      Chloride Chloride   Date Value Ref Range Status   04/05/2017 99 98 - 107 mmol/L Final      Bicarbonate No results found for: PLASMABICARB   BUN BUN   Date Value Ref Range Status   04/05/2017 22 8 - 23 mg/dL Final      Creatinine Creatinine   Date Value Ref Range Status   04/05/2017 0.84  0.57 - 1.00 mg/dL Final      Calcium Calcium   Date Value Ref Range Status   04/05/2017 8.0 (L) 8.8 - 10.5 mg/dL Final      Magnesium  AST  ALT  Bilirubin, Total  AlkPhos  Albumin    Amylase  Lipase    Radiology: Magnesium   Date Value Ref Range Status   04/05/2017 1.6 (L) 1.7 - 2.5 mg/dL Final     No components found for: AST.*  No components found for: ALT.*  No components found for: BILIRUBIN, TOTAL.*    No components found for: ALKPHOS.*  No components found for: ALBUMIN.*      No components found for: AMYLASE.*  No components found for: LIPASE.*            Imaging Results (most recent)     Procedure Component Value Units Date/Time    XR Knee 1 or 2 View Right [[id number]] Collected:  04/04/17 1628     Updated:  04/04/17 1632    Narrative:       EXAM: 2 views of the right knee.     DATE: 04/04/2017     HISTORY:: Right knee replacement today.     COMPARISON: 2 views the right knee 03/16/2017.     FINDINGS: Total right knee replacement changes are present. The  prosthesis appears appropriately seated. No periprosthetic fracture  is  seen. The knee joint appears appropriately aligned. Surgical drains in  place within the anterior soft tissues. Expected postoperative  subcutaneous air and soft tissue swelling.       Impression:       1. Satisfactory postoperative appearance status post right knee  replacement.     This report was finalized on 4/4/2017 4:29 PM by Dr. Ny Cameron MD.                  lactated ringers 30 mL/hr Last Rate: Stopped (04/04/17 1944)         Assessment/Plan     Patient Active Problem List   Diagnosis Code   • Left knee DJD M17.9   • Osteoarthritis of right knee M17.9       DC Hemovac.  Continue PT OT today.  Home in the next 24-48 hours      Jerry Yarbrough MD  04/05/17  7:06 AM

## 2017-04-05 NOTE — DISCHARGE INSTR - OTHER ORDERS
Outpatient PT appointment is April 10th @4:30 pm. Please arrive @ 4:15 pm-[facility].

## 2017-04-05 NOTE — PROGRESS NOTES
Acute Care - Physical Therapy Treatment Note   Brandee Cartwright     Patient Name: Liana Patel  : 1955  MRN: [id number]  Today's Date: 2017  Onset of Illness/Injury or Date of Surgery Date: 17  Date of Referral to PT: 17  Referring Physician: Dr Yarbrough    Admit Date: 2017    Visit Dx:    ICD-10-CM ICD-9-CM   1. Primary osteoarthritis of right knee M17.11 715.16     Patient Active Problem List   Diagnosis   • Left knee DJD   • Osteoarthritis of right knee               Adult Rehabilitation Note       17 1308          Rehab Assessment/Intervention    Discipline physical therapist  -BP      Document Type Therapy note (daily)  -BP      Subjective Information agree to therapy;no complaints  -BP      Patient Effort, Rehab Treatment excellent  -BP      Symptoms Noted During/After Treatment increased pain  -BP      Precautions/Limitations fall precautions  -BP      Recorded by [BP] Nunu Dickinson, PT      Pain Assessment    Pain Assessment 0-10  -BP      Pain Score 3  -BP      Pain Type Acute pain;Surgical pain  -BP      Pain Location Knee  -BP      Pain Orientation Right  -BP      Pain Intervention(s) Repositioned;Cold applied  -BP      Response to Interventions tolerated  -BP      Recorded by [BP] Nunu Dickinson, PT      Bed Mobility, Assessment/Treatment    Bed Mobility, Comment deferred up in chair  -BP      Recorded by [BP] Nunu Dickinson, PT      Transfer Assessment/Treatment    Transfers, Sit-Stand [place] stand by assist;verbal cues required  -BP      Transfers, Stand-Sit [place] stand by assist;verbal cues required  -BP      Transfers, Sit-Stand-Sit, Assist Device rolling walker  -BP      Toilet Transfer, [place] --   SBA  -BP      Toilet Transfer, Assistive Device elevated toilet seat;rolling walker  -BP      Transfer, Comment Patient requires verbal cues for hand placement  -BP      Recorded by [BP] Nunu Dickinson, PT      Gait Assessment/Treatment    Gait,  [place] Level stand by assist   assist for IV line   -BP      Gait, Assistive Device rolling walker  -BP      Gait, Distance (Feet) 210  -BP      Gait, Gait Pattern Analysis swing-through gait  -BP      Gait, Gait Deviations ligia decreased;right:;decreased heel strike;antalgic;left:;step length decreased  -BP      Gait, Safety Issues step length decreased;balance decreased during turns  -BP      Gait, Comment Patient demonstrates good gait speed with swing through gait. Patient navigates all directional changes and external obstacles without LOB. Patient demonstrates safe use of AD, maintaining proper distance from RW during gait training.   -BP      Recorded by [BP] Nunu Dickinson, PT      Therapy Exercises    Right Lower Extremity AROM:;10 reps;sitting;hip abduction/adduction;LAQ;SLR;hip flexion;quad sets;heel slides  -BP      Recorded by [BP] Nunu Dickinson, PT      Positioning and Restraints    Pre-Treatment Position sitting in chair/recliner  -BP      Post Treatment Position chair  -BP      In Chair reclined;call light within reach;encouraged to call for assist  -BP      Recorded by [BP] Nunu Dickinson, PT        User Key  (r) = Recorded By, (t) = Taken By, (c) = Cosigned By    Initials Name Effective Dates     Nunu Dickinson, PT 12/01/15 -                 IP PT Goals       04/05/17 1118          Bed Mobility PT STG    Bed Mobility PT STG, Date Established 04/05/17  -      Bed Mobility PT STG, Time to Achieve 3 days  -JW      Bed Mobility PT STG, Activity Type all bed mobility  -JW      Bed Mobility PT STG, [place] Level supervision required  -JW      Transfer Training PT STG    Transfer Training PT STG, Date Established 04/05/17  -      Transfer Training PT STG, Time to Achieve 3 days  -JW      Transfer Training PT STG, Activity Type all transfers  -JW      Transfer Training PT STG, [place] Level supervision required  -      Transfer Training PT STG, Assist Device walker,  rolling  -JW      Gait Training PT STG    Gait Training Goal PT STG, Date Established 04/05/17  -JW      Gait Training Goal PT STG, Time to Achieve 3 days  -JW      Gait Training Goal PT STG, [place] Level supervision required  -JW      Gait Training Goal PT STG, Assist Device walker, rolling  -JW      Gait Training Goal PT STG, Distance to Achieve 200  -JW      Stair Training PT STG    Stair Training Goal PT STG, Date Established 04/05/17  -JW      Stair Training Goal PT STG, Time to Achieve 3 days  -JW      Stair Training Goal PT STG, Number of Steps 1  -JW      Stair Training Goal PT STG, [place] Level contact guard assist  -JW      Stair Training Goal PT STG, Assist Device 1 handrail  -JW      Patient Education PT STG    Patient Education PT STG, Date Established 04/05/17  -JW      Patient Education PT STG, Time to Achieve 3 days  -JW      Patient Education PT STG, Education Type written program;HEP  -JW      Patient Education PT STG, Education Understanding demonstrate adequately;verbalize understanding  -JW        User Key  (r) = Recorded By, (t) = Taken By, (c) = Cosigned By    Initials Name Provider Type    JW Yissel Tran, PT Physical Therapist          Physical Therapy Education     Title: PT OT SLP Therapies (Done)     Topic: Physical Therapy (Done)     Point: Mobility training (Done)    Learning Progress Summary    Learner Readiness Method Response Comment Documented by Status   Patient Acceptance E   BP 04/05/17 1430 Done    Acceptance E VU  JW 04/05/17 1118 Done               Point: Home exercise program (Done)    Learning Progress Summary    Learner Readiness Method Response Comment Documented by Status   Patient Acceptance E VU  BP 04/05/17 1430 Done    Acceptance E VU  JW 04/05/17 1118 Done                      User Key     Initials Effective Dates Name Provider Type Discipline    BP 12/01/15 -  Nunu Dickinson, PT Physical Therapist PT     12/01/15 -  Yissel Tran PT Physical  Therapist PT                    PT Recommendation and Plan  Anticipated Discharge Disposition: home with outpatient services  Planned Therapy Interventions: balance training, bed mobility training, gait training, home exercise program, strengthening, transfer training, stair training  PT Frequency: 2 times/day  Plan of Care Review  Plan Of Care Reviewed With: patient  Progress: progress toward functional goals as expected  Outcome Summary/Follow up Plan: PT: Patient with improved gait distance this afternoon and required decreased assist for functional mobility. Patient demonstrates good gait mechanics and speed  with use of RW. Overall progressing with physical therapy.           Outcome Measures       04/05/17 1308 04/05/17 0929 04/05/17 0928    How much help from another person do you currently need...    Turning from your back to your side while in flat bed without using bedrails? 3  -BP  3  -JW    Moving from lying on back to sitting on the side of a flat bed without bedrails? 3  -BP  3  -JW    Moving to and from a bed to a chair (including a wheelchair)? 3  -BP  3  -JW    Standing up from a chair using your arms (e.g., wheelchair, bedside chair)? 3  -BP  3  -JW    Climbing 3-5 steps with a railing? 3  -BP  3  -JW    To walk in hospital room? 3  -BP  3  -JW    AM-PAC 6 Clicks Score 18  -BP  18  -JW    How much help from another is currently needed...    Putting on and taking off regular lower body clothing?  3  -JJ     Bathing (including washing, rinsing, and drying)  3  -JJ     Toileting (which includes using toilet bed pan or urinal)  4  -JJ     Putting on and taking off regular upper body clothing  4  -JJ     Taking care of personal grooming (such as brushing teeth)  4  -JJ     Eating meals  4  -JJ     Score  22  -JJ     Functional Assessment    Outcome Measure Options AM-PAC 6 Clicks Basic Mobility (PT)  -BP AM-PAC 6 Clicks Daily Activity (OT)  -JJ AM-PAC 6 Clicks Basic Mobility (PT)  -JW      User Key  (r)  = Recorded By, (t) = Taken By, (c) = Cosigned By    Initials Name Provider Type    JUAN DAVID Soto, OTR Occupational Therapist    BP Nunu Dickinson, PT Physical Therapist    AMADO Tran, PT Physical Therapist           Time Calculation:         PT Charges       04/05/17 1433 04/05/17 1121       Time Calculation    Start Time 1308  -BP 0928  -JW     Stop Time 1335  -BP      Time Calculation (min) 27 min  -BP      PT Received On 04/05/17  -BP 04/05/17  -AMADO     PT - Next Appointment 04/06/17  -BP 04/05/17  -AMADO       User Key  (r) = Recorded By, (t) = Taken By, (c) = Cosigned By    Initials Name Provider Type    BP Nunu Dickinson, PT Physical Therapist    AMADO Tran, PT Physical Therapist          Therapy Charges for Today     Code Description Service Date Service Provider Modifiers Qty    20609174147 HC PT THER PROC EA 15 MIN 4/5/2017 Nunu Dickinson, PT GP 1    19530425212 HC GAIT TRAINING EA 15 MIN 4/5/2017 Nunu Dickinson, PT GP 1          PT G-Codes  Outcome Measure Options: AM-PAC 6 Clicks Basic Mobility (PT)    Nunu Dickinson, PT  4/5/2017

## 2017-04-05 NOTE — PLAN OF CARE
Problem: Patient Care Overview (Adult)  Goal: Plan of Care Review    04/05/17 8180   Coping/Psychosocial Response Interventions   Plan Of Care Reviewed With patient   Outcome Evaluation   Outcome Summary/Follow up Plan OT evaluation completed. pt cga for functional trnasfers and mobility with rolling walker. pt sba for adls with use of long handled ae. pt used ae for previous knee sx and has no concerns with using again. pt states no other OT concerns at this time and is going home with family assist if needed and continuing therapy with out pt services

## 2017-04-05 NOTE — NURSING NOTE
Continued Stay Note  SANGITA Hobbs     Patient Name: Liana Patel  MRN: [id number]  Today's Date: 4/5/2017    Admit Date: 4/4/2017          Discharge Plan       04/05/17 1356    Case Management/Social Work Plan    Plan plan is home with outpatient PT     Additional Comments spoke with Gilma @ [facility] to arrange outpatient PT appt for April 7th @ 5pm. arrive @ 4:45pm. faxed information as requested to [phone]. will continue to follow.               Discharge Codes     None            Leonor Bergman RN

## 2017-04-05 NOTE — PLAN OF CARE
Problem: Patient Care Overview (Adult)  Goal: Plan of Care Review  Outcome: Ongoing (interventions implemented as appropriate)    04/04/17 1049   Coping/Psychosocial Response Interventions   Plan Of Care Reviewed With patient   Patient Care Overview   Progress improving   Outcome Evaluation   Outcome Summary/Follow up Plan vss, waiting to go to floor       Goal: Adult Individualization and Mutuality  Outcome: Ongoing (interventions implemented as appropriate)  Goal: Discharge Needs Assessment  Outcome: Ongoing (interventions implemented as appropriate)

## 2017-04-05 NOTE — PLAN OF CARE
Problem: Patient Care Overview (Adult)  Goal: Discharge Needs Assessment  Outcome: Ongoing (interventions implemented as appropriate)    04/04/17 2312 04/05/17 0920   Discharge Needs Assessment   Concerns To Be Addressed --  discharge planning concerns   Readmission Within The Last 30 Days --  no previous admission in last 30 days   Equipment Needed After Discharge --  (will continue to assess)   Discharge Disposition still a patient --    Discharge Planning Comments --  spoke with patient at bedside. patient lives with son, daughter in law, 2 grandchildren and her mother. [place]. she has not used HH or home O2 in the past. has a rolling walker, BSC and cane. independent with ADL's prior to surgery and also driving. uses [employer] and denies having difficulty paying for medications. does not have a living will. patient interested so spiritual consult placed. plan is home with outpatient PT. she stated she has used [facility] in the past but is interested in [facility] outpatient PT. she said the last time she had surgery she was able to do her exercises at home because of schedulitng conflict with appt times. she states she has not used home health in the past because of her driveway someone would need a 4x4 vehicle. will continue to follow.    Current Health   Outpatient/Agency/Support Group Needs --  (used [facility] for outpatient PT in the past)   Anticipated Changes Related to Illness --  (will continue to assess)   Living Environment   Transportation Available --  family or friend will provide   Self-Care   Equipment Currently Used at Home --  none  (has a rolling walker, BSC and cane)

## 2017-04-05 NOTE — PROGRESS NOTES
Patient: Liana Patel  Procedure(s):  TOTAL KNEE ARTHROPLASTY, lc, orthoalign  Anesthesia type: [unfilled]    Patient location: [facility] Surgical Floor  Vitals:    04/04/17 2321 04/05/17 0336 04/05/17 0602 04/05/17 1100   BP: 98/56 97/64 111/67 91/60   BP Location: Right arm Right arm Right arm Right arm   Patient Position: Lying Lying Lying Lying   Pulse: 68 72 77 75   Resp: 18 18 18 18   Temp: 97.7 °F (36.5 °C) 97.8 °F (36.6 °C) 98.1 °F (36.7 °C) 97.7 °F (36.5 °C)   TempSrc: Oral Oral Oral Oral   SpO2: 93% 95% 95% 93%   Weight:       Height:         Level of consciousness: awake, alert and oriented    Post-anesthesia pain: adequate analgesia  Airway patency: patent  Respiratory: unassisted  Cardiovascular: stable and blood pressure at baseline  Hydration: euvolemic    Anesthetic complications: no

## 2017-04-05 NOTE — PLAN OF CARE
Problem: Patient Care Overview (Adult)  Goal: Plan of Care Review  Outcome: Ongoing (interventions implemented as appropriate)    04/05/17 1120   Coping/Psychosocial Response Interventions   Plan Of Care Reviewed With patient   Outcome Evaluation   Outcome Summary/Follow up Plan Physical therapy evaluation complete. Patient requires CGA for transfers and gait with rolling walker x150 feet, CGA. Patient able to complete SLR x10, knee immobilizer discontinued. Patient will benefit from physical therapy to address deficits in functional mobility, ROM and strength. Recommend home with outpatient PT services.

## 2017-04-05 NOTE — PLAN OF CARE
Problem: Inpatient Physical Therapy  Goal: Bed Mobility Goal STG- PT  Outcome: Ongoing (interventions implemented as appropriate)    04/05/17 1118   Bed Mobility PT STG   Bed Mobility PT STG, Date Established 04/05/17   Bed Mobility PT STG, Time to Achieve 3 days   Bed Mobility PT STG, Activity Type all bed mobility   Bed Mobility PT STG, [place] Level supervision required       Goal: Transfer Training Goal 1 STG- PT  Outcome: Ongoing (interventions implemented as appropriate)    04/05/17 1118   Transfer Training PT STG   Transfer Training PT STG, Date Established 04/05/17   Transfer Training PT STG, Time to Achieve 3 days   Transfer Training PT STG, Activity Type all transfers   Transfer Training PT STG, [place] Level supervision required   Transfer Training PT STG, Assist Device walker, rolling       Goal: Gait Training Goal STG- PT  Outcome: Ongoing (interventions implemented as appropriate)    04/05/17 1118   Gait Training PT STG   Gait Training Goal PT STG, Date Established 04/05/17   Gait Training Goal PT STG, Time to Achieve 3 days   Gait Training Goal PT STG, [place] Level supervision required   Gait Training Goal PT STG, Assist Device walker, rolling   Gait Training Goal PT STG, Distance to Achieve 200       Goal: Stair Training Goal STG- PT  Outcome: Ongoing (interventions implemented as appropriate)    04/05/17 1118   Stair Training PT STG   Stair Training Goal PT STG, Date Established 04/05/17   Stair Training Goal PT STG, Time to Achieve 3 days   Stair Training Goal PT STG, Number of Steps 1   Stair Training Goal PT STG, [place] Level contact guard assist   Stair Training Goal PT STG, Assist Device 1 handrail       Goal: Patient Education Goal STG- PT  Outcome: Ongoing (interventions implemented as appropriate)    04/05/17 1118   Patient Education PT STG   Patient Education PT STG, Date Established 04/05/17   Patient Education PT STG, Time to Achieve 3 days   Patient Education PT  STG, Education Type written program;HEP   Patient Education PT STG, Education Understanding demonstrate adequately;verbalize understanding

## 2017-04-05 NOTE — PROGRESS NOTES
"SERVICE: [facility] HOSPITALIST    CONSULTANTS: Ortho    CHIEF COMPLAINT: f/u RTKA     SUBJECTIVE: The patient reports she is feeling well, pain is well-controlled, ambulating easily with PT/OT. She Denies f/c/cough/soa/chest pain/n/v/d/abdominal pain or other new concerns.    OBJECTIVE:    BP 91/60 (BP Location: Right arm, Patient Position: Lying)  Pulse 75  Temp 97.7 °F (36.5 °C) (Oral)   Resp 18  Ht 64\" (162.6 cm)  Wt 231 lb (105 kg)  LMP  (LMP Unknown)  SpO2 93%  BMI 39.65 kg/m2    MEDS/LABS REVIEWED AND ORDERED    acetaminophen 1,000 mg Oral Q6H   aspirin 325 mg Oral Q12H   celecoxib 200 mg Oral Daily   metoprolol tartrate 100 mg Oral Q12H   potassium chloride 20 mEq Oral TID   pregabalin 75 mg Oral Q12H   sennosides-docusate sodium 2 tablet Oral BID   tranexamic acid 1,000 mg Intravenous Once     Physical Exam   Constitutional: She is oriented to person, place, and time. She appears well-developed and well-nourished.   HENT:   Head: Normocephalic and atraumatic.   Eyes: EOM are normal. Pupils are equal, round, and reactive to light.   Cardiovascular: Normal rate and regular rhythm.    Pulmonary/Chest: Effort normal and breath sounds normal. No respiratory distress. She has no wheezes. She has no rales.   Abdominal: Soft. Bowel sounds are normal. She exhibits no distension. There is no tenderness.   obese   Musculoskeletal: She exhibits no edema.   Neurological: She is alert and oriented to person, place, and time.   Skin: Skin is warm and dry.   Left knee with ace/ice, incision not visualized   Psychiatric: She has a normal mood and affect. Her behavior is normal. Judgment and thought content normal.   Vitals reviewed.    LAB/DIAGNOSTICS:    Lab Results (last 24 hours)     Procedure Component Value Units Date/Time    Magnesium [[id number]]  (Abnormal) Collected:  04/05/17 0449    Specimen:  Blood Updated:  04/05/17 0543     Magnesium 1.6 (L) mg/dL     Basic Metabolic Panel [[id number]]  " (Abnormal) Collected:  04/05/17 0449    Specimen:  Blood Updated:  04/05/17 0547     Glucose 133 (H) mg/dL      BUN 22 mg/dL      Creatinine 0.84 mg/dL      Sodium 140 mmol/L      Potassium 4.1 mmol/L      Chloride 99 mmol/L      CO2 28.6 mmol/L      Calcium 8.0 (L) mg/dL      eGFR Non African Amer 69 mL/min/1.73      BUN/Creatinine Ratio 26.2 (H)     Anion Gap 12.4 mmol/L     Narrative:       GFR Normal >60  Chronic Kidney Disease <60  Kidney Failure <15    aPTT [[id number]]  (Normal) Collected:  04/05/17 0449    Specimen:  Blood Updated:  04/05/17 0600     PTT 28.2 seconds     Narrative:       PTT = The equivalent PTT values for the therapeutic range of heparin levels at 0.1 to 0.7 U/ml are 53 to 110 seconds.    CBC & Differential [[id number]] Collected:  04/05/17 0449    Specimen:  Blood Updated:  04/05/17 0625    Narrative:       The following orders were created for panel order CBC & Differential.  Procedure                               Abnormality         Status                     ---------                               -----------         ------                     Manual Differential[[id number]]           Abnormal            Final result               CBC Auto Differential[[id number]]         Abnormal            Final result                 Please view results for these tests on the individual orders.    CBC Auto Differential [[id number]]  (Abnormal) Collected:  04/05/17 0449    Specimen:  Blood Updated:  04/05/17 0625     WBC 20.53 (H) 10*3/mm3      RBC 3.99 (L) 10*6/mm3      Hemoglobin 11.6 (L) g/dL      Hematocrit 34.7 (L) %      MCV 87.0 fL      MCH 29.1 pg      MCHC 33.4 g/dL      RDW 13.2 %      RDW-SD 41.4 fl      MPV 9.7 fL      Platelets 220 10*3/mm3     Manual Differential [[id number]]  (Abnormal) Collected:  04/05/17 0449    Specimen:  Blood Updated:  04/05/17 0625     Neutrophil % 75.0 (H) %      Lymphocyte % 16.0 (L) %      Monocyte % 2.0 (L) %      Bands %  7.0 (H) %      Neutrophils Absolute 16.83 (H)  10*3/mm3      Lymphocytes Absolute 3.28 10*3/mm3      Monocytes Absolute 0.41 10*3/mm3      RBC Morphology Normal     WBC Morphology Normal     Platelet Estimate Adequate        ASSESSMENT/PLAN:  1. S/P RTKA secondary to end stage degenerative arthritis: Dr. Yarbrough managing  Pain/PT/OT/DVT prophy with  mg every 12 hours per Dr. Yarbrough    2. Hypertension:   BP low goal on amlodipine 5 mg bid/metoprolol tartrate 100 mg every 12 hours  D/C amlodipine   Monitor     3. Obesity:  Body mass index is 39.65 kg/(m^2).   Nutrition following    4. H/O hypokalemia/hypomagnesemia:   Continued on home three times daily potassium-normal this am  Magnesium low this am-give 2 gm IV now  Recheck in am    5. Chronic leukocytosis:  Afebrile, no acute findings, present back to 8/2016  Plan f/u with Hem/Onc after discharge.

## 2017-04-05 NOTE — PLAN OF CARE
Problem: Patient Care Overview (Adult)  Goal: Plan of Care Review  Outcome: Ongoing (interventions implemented as appropriate)  Goal: Discharge Needs Assessment  Outcome: Ongoing (interventions implemented as appropriate)    Problem: Perioperative Period (Adult)  Goal: Signs and Symptoms of Listed Potential Problems Will be Absent or Manageable (Perioperative Period)  Outcome: Ongoing (interventions implemented as appropriate)    Problem: Fall Risk (Adult)  Goal: Identify Related Risk Factors and Signs and Symptoms  Outcome: Ongoing (interventions implemented as appropriate)  Goal: Absence of Falls  Outcome: Ongoing (interventions implemented as appropriate)

## 2017-04-06 LAB
ANION GAP SERPL CALCULATED.3IONS-SCNC: 10.6 MMOL/L
BASOPHILS # BLD AUTO: 0.05 10*3/MM3 (ref 0–0.2)
BASOPHILS NFR BLD AUTO: 0.3 % (ref 0–2)
BUN BLD-MCNC: 20 MG/DL (ref 8–23)
BUN/CREAT SERPL: 30.3 (ref 7–25)
CALCIUM SPEC-SCNC: 7.6 MG/DL (ref 8.8–10.5)
CHLORIDE SERPL-SCNC: 103 MMOL/L (ref 98–107)
CO2 SERPL-SCNC: 29.4 MMOL/L (ref 22–29)
CREAT BLD-MCNC: 0.66 MG/DL (ref 0.57–1)
DEPRECATED RDW RBC AUTO: 44.2 FL (ref 37–54)
EOSINOPHIL # BLD AUTO: 0.22 10*3/MM3 (ref 0.1–0.3)
EOSINOPHIL NFR BLD AUTO: 1.5 % (ref 0–4)
ERYTHROCYTE [DISTWIDTH] IN BLOOD BY AUTOMATED COUNT: 13.6 % (ref 11.5–14.5)
GFR SERPL CREATININE-BSD FRML MDRD: 91 ML/MIN/1.73
GLUCOSE BLD-MCNC: 113 MG/DL (ref 65–99)
HCT VFR BLD AUTO: 31.5 % (ref 37–47)
HGB BLD-MCNC: 10.4 G/DL (ref 12–16)
IMM GRANULOCYTES # BLD: 0.17 10*3/MM3 (ref 0–0.03)
IMM GRANULOCYTES NFR BLD: 1.2 % (ref 0–0.5)
LYMPHOCYTES # BLD AUTO: 4.51 10*3/MM3 (ref 0.6–4.8)
LYMPHOCYTES NFR BLD AUTO: 31.3 % (ref 20–45)
MAGNESIUM SERPL-MCNC: 2.1 MG/DL (ref 1.7–2.5)
MCH RBC QN AUTO: 29.4 PG (ref 27–31)
MCHC RBC AUTO-ENTMCNC: 33 G/DL (ref 31–37)
MCV RBC AUTO: 89 FL (ref 81–99)
MONOCYTES # BLD AUTO: 1.21 10*3/MM3 (ref 0–1)
MONOCYTES NFR BLD AUTO: 8.4 % (ref 3–8)
NEUTROPHILS # BLD AUTO: 8.26 10*3/MM3 (ref 1.5–8.3)
NEUTROPHILS NFR BLD AUTO: 57.3 % (ref 45–70)
NRBC BLD MANUAL-RTO: 0 /100 WBC (ref 0–0)
PLATELET # BLD AUTO: 188 10*3/MM3 (ref 140–500)
PMV BLD AUTO: 9.7 FL (ref 7.4–10.4)
POTASSIUM BLD-SCNC: 4 MMOL/L (ref 3.5–5.2)
RBC # BLD AUTO: 3.54 10*6/MM3 (ref 4.2–5.4)
SODIUM BLD-SCNC: 143 MMOL/L (ref 136–145)
WBC NRBC COR # BLD: 14.42 10*3/MM3 (ref 4.8–10.8)

## 2017-04-06 PROCEDURE — 80048 BASIC METABOLIC PNL TOTAL CA: CPT | Performed by: NURSE PRACTITIONER

## 2017-04-06 PROCEDURE — 99024 POSTOP FOLLOW-UP VISIT: CPT | Performed by: ORTHOPAEDIC SURGERY

## 2017-04-06 PROCEDURE — 97110 THERAPEUTIC EXERCISES: CPT

## 2017-04-06 PROCEDURE — 83735 ASSAY OF MAGNESIUM: CPT | Performed by: NURSE PRACTITIONER

## 2017-04-06 PROCEDURE — 97116 GAIT TRAINING THERAPY: CPT

## 2017-04-06 PROCEDURE — 85025 COMPLETE CBC W/AUTO DIFF WBC: CPT | Performed by: ORTHOPAEDIC SURGERY

## 2017-04-06 PROCEDURE — 99231 SBSQ HOSP IP/OBS SF/LOW 25: CPT | Performed by: NURSE PRACTITIONER

## 2017-04-06 RX ORDER — OXYCODONE HYDROCHLORIDE 5 MG/1
15 TABLET ORAL EVERY 4 HOURS PRN
Status: DISCONTINUED | OUTPATIENT
Start: 2017-04-06 | End: 2017-04-07 | Stop reason: HOSPADM

## 2017-04-06 RX ADMIN — PREGABALIN 75 MG: 75 CAPSULE ORAL at 20:50

## 2017-04-06 RX ADMIN — DOCUSATE SODIUM AND SENNOSIDES 2 TABLET: 8.6; 5 TABLET, FILM COATED ORAL at 09:32

## 2017-04-06 RX ADMIN — POTASSIUM CHLORIDE 20 MEQ: 20 TABLET, EXTENDED RELEASE ORAL at 09:32

## 2017-04-06 RX ADMIN — ASPIRIN 325 MG: 325 TABLET ORAL at 06:22

## 2017-04-06 RX ADMIN — ACETAMINOPHEN 1000 MG: 500 TABLET ORAL at 13:36

## 2017-04-06 RX ADMIN — POTASSIUM CHLORIDE 20 MEQ: 20 TABLET, EXTENDED RELEASE ORAL at 18:06

## 2017-04-06 RX ADMIN — DOCUSATE SODIUM AND SENNOSIDES 2 TABLET: 8.6; 5 TABLET, FILM COATED ORAL at 18:06

## 2017-04-06 RX ADMIN — CELECOXIB 200 MG: 200 CAPSULE ORAL at 09:33

## 2017-04-06 RX ADMIN — METOPROLOL TARTRATE 100 MG: 50 TABLET, FILM COATED ORAL at 09:34

## 2017-04-06 RX ADMIN — ASPIRIN 325 MG: 325 TABLET ORAL at 18:06

## 2017-04-06 RX ADMIN — OXYCODONE HYDROCHLORIDE 15 MG: 5 TABLET ORAL at 18:06

## 2017-04-06 RX ADMIN — POTASSIUM CHLORIDE 20 MEQ: 20 TABLET, EXTENDED RELEASE ORAL at 20:50

## 2017-04-06 RX ADMIN — OXYCODONE HYDROCHLORIDE 15 MG: 5 TABLET ORAL at 09:32

## 2017-04-06 RX ADMIN — ACETAMINOPHEN 1000 MG: 500 TABLET ORAL at 05:18

## 2017-04-06 RX ADMIN — ACETAMINOPHEN 1000 MG: 500 TABLET ORAL at 01:07

## 2017-04-06 RX ADMIN — PREGABALIN 75 MG: 75 CAPSULE ORAL at 09:32

## 2017-04-06 RX ADMIN — ACETAMINOPHEN 1000 MG: 500 TABLET ORAL at 18:06

## 2017-04-06 RX ADMIN — OXYCODONE HYDROCHLORIDE 15 MG: 5 TABLET ORAL at 22:18

## 2017-04-06 RX ADMIN — OXYCODONE HYDROCHLORIDE 10 MG: 5 TABLET ORAL at 05:17

## 2017-04-06 RX ADMIN — OXYCODONE HYDROCHLORIDE 15 MG: 5 TABLET ORAL at 13:36

## 2017-04-06 NOTE — PROGRESS NOTES
Acute Care - Physical Therapy Treatment Note   [place]     Patient Name: Liana Patel  : 1955  MRN: [id number]  Today's Date: 2017  Onset of Illness/Injury or Date of Surgery Date: 17  Date of Referral to PT: 17  Referring Physician: Dr Yarbrough    Admit Date: 2017    Visit Dx:    ICD-10-CM ICD-9-CM   1. Primary osteoarthritis of right knee M17.11 715.16     Patient Active Problem List   Diagnosis   • Left knee DJD   • Osteoarthritis of right knee               Adult Rehabilitation Note       17 0953 17 1308       Rehab Assessment/Intervention    Discipline physical therapist  -BP physical therapist  -BP     Document Type therapy note (daily note)  -BP therapy note (daily note)  -BP     Subjective Information agree to therapy;complains of;pain  -BP agree to therapy;no complaints  -BP     Patient Effort, Rehab Treatment excellent  -BP excellent  -BP     Symptoms Noted During/After Treatment increased pain  -BP increased pain  -BP     Precautions/Limitations fall precautions  -BP fall precautions  -BP     Recorded by [BP] Nunu Dickinson, PT [BP] Nunu Dickinson, PT     Pain Assessment    Pain Assessment 0-10  -BP 0-10  -BP     Pain Score 5  -BP 3  -BP     Pain Type Acute pain;Surgical pain  -BP Acute pain;Surgical pain  -BP     Pain Location Knee  -BP Knee  -BP     Pain Orientation Right  -BP Right  -BP     Pain Intervention(s)  Repositioned;Cold applied  -BP     Response to Interventions  tolerated  -BP     Recorded by [BP] Nunu Dickinson, PT [BP] Nunu Dickinson, PT     Cognitive Assessment/Intervention    Personal Safety Interventions gait belt;nonskid shoes/slippers when out of bed  -BP      Recorded by [BP] Nunu Dickinson, PT      Bed Mobility, Assessment/Treatment    Bed Mobility, Comment deferred up in chair  -BP deferred up in chair  -BP     Recorded by [BP] Nunu Dickinson, PT [BP] Nunu Dickinson, PT     Transfer Assessment/Treatment    Transfers,  Sit-Stand [place] stand by assist  -BP stand by assist;verbal cues required  -BP     Transfers, Stand-Sit [place] stand by assist  -BP stand by assist;verbal cues required  -BP     Transfers, Sit-Stand-Sit, Assist Device rolling walker  -BP rolling walker  -BP     Toilet Transfer, [place]  --   SBA  -BP     Toilet Transfer, Assistive Device  elevated toilet seat;rolling walker  -BP     Transfer, Comment  Patient requires verbal cues for hand placement  -BP     Recorded by [BP] Nunu Dickinson, PT [BP] Nunu Dickinson, PT     Gait Assessment/Treatment    Gait, [place] Level stand by assist;supervision required  -BP stand by assist   assist for IV line   -BP     Gait, Assistive Device rolling walker  -BP rolling walker  -BP     Gait, Distance (Feet) 100  -  -BP     Gait, Gait Pattern Analysis swing-through gait  -BP swing-through gait  -BP     Gait, Gait Deviations ligia decreased;forward flexed posture;right:;antalgic;decreased heel strike;left:;step length decreased  -BP ligia decreased;right:;decreased heel strike;antalgic;left:;step length decreased  -BP     Gait, Safety Issues step length decreased  -BP step length decreased;balance decreased during turns  -BP     Gait, Impairments pain  -BP      Gait, Comment Patient with increased pain today which limited further gait distance. Patient continues to demonstrate step through gait pattern, however demonstrates decreased R knee flexion due to pain.   -BP Patient demonstrates good gait speed with swing through gait. Patient navigates all directional changes and external obstacles without LOB. Patient demonstrates safe use of AD, maintaining proper distance from RW during gait training.   -BP     Recorded by [BP] Nunu Dickinson, PT [BP] Nunu Dickinson, PT     Stairs Assessment/Treatment    Number of Stairs 3   2 curb stairs, 3 stairs with 2 hand rails   -BP      Stairs, Handrail Location both sides  -BP      Stairs, [place]  Level contact guard assist;verbal cues required  -BP      Stairs, Assistive Device walker  -BP      Stairs, Comment Patient ascended/descended 3 stairs with 2 hand rails. Performed 2 curb stairs with use of RW. Patient states she has one curb stair at home.   -BP      Recorded by [BP] Nunu Dickinson, PT      Therapy Exercises    Right Lower Extremity AROM:;10 reps;sitting;SLR;LAQ;quad sets;hip abduction/adduction;heel slides   written HEP provided and reviewed   -BP AROM:;10 reps;sitting;hip abduction/adduction;LAQ;SLR;hip flexion;quad sets;heel slides  -BP     Recorded by [BP] Nunu Dickinson, PT [BP] Nunu Dickisnon PT     Positioning and Restraints    Pre-Treatment Position sitting in chair/recliner  -BP sitting in chair/recliner  -BP     Post Treatment Position chair  -BP chair  -BP     In Chair reclined;call light within reach;encouraged to call for assist  -BP reclined;call light within reach;encouraged to call for assist  -BP     Recorded by [BP] Nunu Dickinson, PT [BP] Nunu Dickinson, PT       User Key  (r) = Recorded By, (t) = Taken By, (c) = Cosigned By    Initials Name Effective Dates     Nunu Dickinson, PT 12/01/15 -                 IP PT Goals       04/05/17 1118          Bed Mobility PT STG    Bed Mobility PT STG, Date Established 04/05/17  -JW      Bed Mobility PT STG, Time to Achieve 3 days  -JW      Bed Mobility PT STG, Activity Type all bed mobility  -JW      Bed Mobility PT STG, [place] Level supervision required  -JW      Transfer Training PT STG    Transfer Training PT STG, Date Established 04/05/17  -JW      Transfer Training PT STG, Time to Achieve 3 days  -JW      Transfer Training PT STG, Activity Type all transfers  -JW      Transfer Training PT STG, [place] Level supervision required  -JW      Transfer Training PT STG, Assist Device walker, rolling  -JW      Gait Training PT STG    Gait Training Goal PT STG, Date Established 04/05/17  -JW      Gait Training Goal PT  STG, Time to Achieve 3 days  -JW      Gait Training Goal PT STG, [place] Level supervision required  -JW      Gait Training Goal PT STG, Assist Device walker, rolling  -JW      Gait Training Goal PT STG, Distance to Achieve 200  -JW      Stair Training PT STG    Stair Training Goal PT STG, Date Established 04/05/17  -JW      Stair Training Goal PT STG, Time to Achieve 3 days  -JW      Stair Training Goal PT STG, Number of Steps 1  -JW      Stair Training Goal PT STG, [place] Level contact guard assist  -JW      Stair Training Goal PT STG, Assist Device 1 handrail  -JW      Patient Education PT STG    Patient Education PT STG, Date Established 04/05/17  -JW      Patient Education PT STG, Time to Achieve 3 days  -JW      Patient Education PT STG, Education Type written program;HEP  -JW      Patient Education PT STG, Education Understanding demonstrate adequately;verbalize understanding  -JW        User Key  (r) = Recorded By, (t) = Taken By, (c) = Cosigned By    Initials Name Provider Type    AMADO Tran, PT Physical Therapist          Physical Therapy Education     Title: PT OT SLP Therapies (Done)     Topic: Physical Therapy (Done)     Point: Mobility training (Done)    Learning Progress Summary    Learner Readiness Method Response Comment Documented by Status   Patient Acceptance E   BP 04/06/17 1043 Done    Acceptance E   BP 04/05/17 1430 Done    Acceptance E   JW 04/05/17 1118 Done               Point: Home exercise program (Done)    Learning Progress Summary    Learner Readiness Method Response Comment Documented by Status   Patient Acceptance E VU  BP 04/06/17 1043 Done    Acceptance E VU  BP 04/05/17 1430 Done    Acceptance E VU  JW 04/05/17 1118 Done                      User Key     Initials Effective Dates Name Provider Type [facility]    BP 12/01/15 -  Nunu Dickinson, PT Physical Therapist PT     12/01/15 -  Yissel Tran, PT Physical Therapist PT                    PT  Recommendation and Plan  Anticipated Discharge Disposition: home with outpatient services  Planned Therapy Interventions: balance training, bed mobility training, gait training, home exercise program, strengthening, transfer training, stair training  PT Frequency: 2 times/day  Plan of Care Review  Plan Of Care Reviewed With: patient  Progress: progress toward functional goals as expected  Outcome Summary/Follow up Plan: PT: Patient wit increased pain today which limited gait distance. Patient safely performed stair training. Written HEP provided and reviewed with patient. Continue to recommend home with outpatient PT at discharge.           Outcome Measures       04/06/17 0953 04/05/17 1308 04/05/17 0929    How much help from another person do you currently need...    Turning from your back to your side while in flat bed without using bedrails? 3  -BP 3  -BP     Moving from lying on back to sitting on the side of a flat bed without bedrails? 3  -BP 3  -BP     Moving to and from a bed to a chair (including a wheelchair)? 3  -BP 3  -BP     Standing up from a chair using your arms (e.g., wheelchair, bedside chair)? 3  -BP 3  -BP     Climbing 3-5 steps with a railing? 3  -BP 3  -BP     To walk in hospital room? 3  -BP 3  -BP     AM-PAC 6 Clicks Score 18  -BP 18  -BP     How much help from another is currently needed...    Putting on and taking off regular lower body clothing?   3  -JJ    Bathing (including washing, rinsing, and drying)   3  -JJ    Toileting (which includes using toilet bed pan or urinal)   4  -JJ    Putting on and taking off regular upper body clothing   4  -JJ    Taking care of personal grooming (such as brushing teeth)   4  -JJ    Eating meals   4  -JJ    Score   22  -JJ    Functional Assessment    Outcome Measure Options AM-PAC 6 Clicks Basic Mobility (PT)  -BP AM-PAC 6 Clicks Basic Mobility (PT)  -BP AM-PAC 6 Clicks Daily Activity (OT)  -JJ      04/05/17 0928          How much help from another  person do you currently need...    Turning from your back to your side while in flat bed without using bedrails? 3  -JW      Moving from lying on back to sitting on the side of a flat bed without bedrails? 3  -JW      Moving to and from a bed to a chair (including a wheelchair)? 3  -JW      Standing up from a chair using your arms (e.g., wheelchair, bedside chair)? 3  -JW      Climbing 3-5 steps with a railing? 3  -JW      To walk in hospital room? 3  -JW      AM-PAC 6 Clicks Score 18  -JW      Functional Assessment    Outcome Measure Options AM-PAC 6 Clicks Basic Mobility (PT)  -JW        User Key  (r) = Recorded By, (t) = Taken By, (c) = Cosigned By    Initials Name Provider Type    JUAN DAVID Soto, OTR Occupational Therapist    BP Nunu Dickinson, PT Physical Therapist    AMADO Tran, PT Physical Therapist           Time Calculation:         PT Charges       04/06/17 1044          Time Calculation    Start Time 0953  -BP      Stop Time 1020  -BP      Time Calculation (min) 27 min  -BP      PT Received On 04/06/17  -BP      PT - Next Appointment 04/06/17  -BP        User Key  (r) = Recorded By, (t) = Taken By, (c) = Cosigned By    Initials Name Provider Type    BP Nunu Dickinson, PT Physical Therapist          Therapy Charges for Today     Code Description Service Date Service Provider Modifiers Qty    48238715431 HC PT THER PROC EA 15 MIN 4/5/2017 Nunu Dickinson, PT GP 1    85209319537 HC GAIT TRAINING EA 15 MIN 4/5/2017 Nunu Dickinson, PT GP 1    42170503042 HC GAIT TRAINING EA 15 MIN 4/6/2017 Nunu Dickinson, PT GP 1    46402925957 HC PT THER PROC EA 15 MIN 4/6/2017 Nunu Dickinson, PT GP 1          PT G-Codes  Outcome Measure Options: AM-PAC 6 Clicks Basic Mobility (PT)    Nunu Dickinson PT  4/6/2017

## 2017-04-06 NOTE — NURSING NOTE
Continued Stay Note  SANGITA Hobbs     Patient Name: Liana Patel  MRN: [id number]  Today's Date: 4/6/2017    Admit Date: 4/4/2017          Discharge Plan       04/06/17 0956    Case Management/Social Work Plan    Plan plan is home with outpatient PT    Additional Comments spoke with Gilma @ [facility] to change outpatient PT appt r/t patient not discharging home today. appt changed to Monday April 10th @ 4:30pm. arrive @ 4:15pm. will continue to follopw.               Discharge Codes     None            Leonor Bergman RN

## 2017-04-06 NOTE — PLAN OF CARE
Problem: Patient Care Overview (Adult)  Goal: Plan of Care Review    04/06/17 1043   Coping/Psychosocial Response Interventions   Plan Of Care Reviewed With patient   Patient Care Overview   Progress progress toward functional goals as expected   Outcome Evaluation   Outcome Summary/Follow up Plan PT: Patient wit increased pain today which limited gait distance. Patient safely performed stair training. Written HEP provided and reviewed with patient. Continue to recommend home with outpatient PT at discharge.

## 2017-04-06 NOTE — PROGRESS NOTES
Acute Care - Physical Therapy Treatment Note   Brandee Cartwright     Patient Name: Liana Patel  : 1955  MRN: [id number]  Today's Date: 2017  Onset of Illness/Injury or Date of Surgery Date: 17  Date of Referral to PT: 17  Referring Physician: Dr Yarbrough    Admit Date: 2017    Visit Dx:    ICD-10-CM ICD-9-CM   1. Primary osteoarthritis of right knee M17.11 715.16     Patient Active Problem List   Diagnosis   • Left knee DJD   • Osteoarthritis of right knee               Adult Rehabilitation Note       17 1346 17 0953 17 1308    Rehab Assessment/Intervention    Discipline physical therapy assistant  -KM physical therapist  -BP physical therapist  -BP    Document Type therapy note (daily note)  -KM therapy note (daily note)  -BP therapy note (daily note)  -BP    Subjective Information agree to therapy  -KM agree to therapy;complains of;pain  -BP agree to therapy;no complaints  -BP    Patient Effort, Rehab Treatment excellent  -KM excellent  -BP excellent  -BP    Symptoms Noted During/After Treatment  increased pain  -BP increased pain  -BP    Symptoms Noted Comment pt reports pain medicine given - pain decreased after session  -KM      Precautions/Limitations fall precautions  -KM fall precautions  -BP fall precautions  -BP    Specific Treatment Considerations pt states after last TKR - home health PT unable to come to her house because they need a four wheel drive to reach her house.  States she has to have late appts due to her son is only transportation and she had only 4 treatments at [facility] because hospital cancelled the rest.   Pt states she did the rest of her therapy on her own.  Pt reports PT there wanted her to perform ex with 40 pounds when she was only 1 week post up.  Encouraged her to speak to Dr. Yarbrough about this .  Discussed with pt progression of ex if she has to continue on her own (including standing and pool ex- pt reports she plans to go to  the pool this summer).    -KM      Recorded by [KM] Ruth Mcmahon PTA [BP] Nunu Dickinson, PT [BP] Nunu Dickinson, PT    Pain Assessment    Pain Assessment 0-10  -KM 0-10  -BP 0-10  -BP    Pain Score 5  -KM 5  -BP 3  -BP    Post Pain Score 4  -KM      Pain Type Acute pain;Surgical pain  -KM Acute pain;Surgical pain  -BP Acute pain;Surgical pain  -BP    Pain Location Knee  -KM Knee  -BP Knee  -BP    Pain Orientation Right  -KM Right  -BP Right  -BP    Pain Intervention(s) Medication (See MAR);Cold applied;Repositioned  -KM  Repositioned;Cold applied  -BP    Response to Interventions   tolerated  -BP    Recorded by [KM] Ruth Mcmahon PTA [BP] Nunu Dickinson, PT [BP] Nunu Dickinson, PT    Cognitive Assessment/Intervention    Personal Safety Interventions gait belt;nonskid shoes/slippers when out of bed  -KM gait belt;nonskid shoes/slippers when out of bed  -BP     Recorded by [KM] Ruth Mcmahon PTA [BP] Nunu Dickinson, PT     Bed Mobility, Assessment/Treatment    Bed Mob, Sit to Supine, [place] supervision required;verbal cues required;conditional independence  -KM      Bed Mobility, Comment  deferred up in chair  -BP deferred up in chair  -BP    Recorded by [KM] Ruth Mcmahon PTA [BP] Nunu Dickinson, PT [BP] Nunu Dickinson, PT    Transfer Assessment/Treatment    Transfers, Sit-Stand [place] stand by assist;verbal cues required  -KM stand by assist  -BP stand by assist;verbal cues required  -BP    Transfers, Stand-Sit [place] stand by assist;verbal cues required  -KM stand by assist  -BP stand by assist;verbal cues required  -BP    Transfers, Sit-Stand-Sit, Assist Device rolling walker  -KM rolling walker  -BP rolling walker  -BP    Toilet Transfer, B[place]   --   SBA  -BP    Toilet Transfer, Assistive Device   elevated toilet seat;rolling walker  -BP    Transfer, Comment   Patient requires verbal cues for hand placement  -BP    Recorded by [KM] Ruth Mcmahon PTA [BP] Nnuu  Alok, PT [BP] Nunu Dickinson, PT    Gait Assessment/Treatment    Gait, [place] Level supervision required;stand by assist;verbal cues required  -KM stand by assist;supervision required  -BP stand by assist   assist for IV line   -BP    Gait, Assistive Device rolling walker  -KM rolling walker  -BP rolling walker  -BP    Gait, Distance (Feet) 220  -  -  -BP    Gait, Gait Pattern Analysis  swing-through gait  -BP swing-through gait  -BP    Gait, Gait Deviations ligia decreased;forward flexed posture  -KM ligia decreased;forward flexed posture;right:;antalgic;decreased heel strike;left:;step length decreased  -BP ligia decreased;right:;decreased heel strike;antalgic;left:;step length decreased  -BP    Gait, Safety Issues step length decreased  -KM step length decreased  -BP step length decreased;balance decreased during turns  -BP    Gait, Impairments  pain  -BP     Gait, Comment required 2 standing rest breaks  -KM Patient with increased pain today which limited further gait distance. Patient continues to demonstrate step through gait pattern, however demonstrates decreased R knee flexion due to pain.   -BP Patient demonstrates good gait speed with swing through gait. Patient navigates all directional changes and external obstacles without LOB. Patient demonstrates safe use of AD, maintaining proper distance from RW during gait training.   -BP    Recorded by [KM] Ruth Mcmahon, GEE [BP] Nunu Dickinson, PT [BP] Nunu Dickinson, PT    Stairs Assessment/Treatment    Number of Stairs  3   2 curb stairs, 3 stairs with 2 hand rails   -BP     Stairs, Handrail Location  both sides  -BP     Stairs, [place] Level  contact guard assist;verbal cues required  -BP     Stairs, Assistive Device  walker  -BP     Stairs, Comment  Patient ascended/descended 3 stairs with 2 hand rails. Performed 2 curb stairs with use of RW. Patient states she has one curb stair at home.   -BP     Recorded by  [BP]  Nunu Dickinson, PT     Therapy Exercises    Right Lower Extremity 20 reps;ankle pumps/circles;heel slides;hip abduction/adduction;quad sets;SAQ   10 reps marching,laq,slr hamstring stretch  -KM AROM:;10 reps;sitting;SLR;LAQ;quad sets;hip abduction/adduction;heel slides   written HEP provided and reviewed   -BP AROM:;10 reps;sitting;hip abduction/adduction;LAQ;SLR;hip flexion;quad sets;heel slides  -BP    RLE Resistance --   reviewed written HEP  -KM      Recorded by [KM] Ruth Mcmahon PTA [BP] Nunu Dickinson, PT [BP] Nunu Dickinson, PT    Positioning and Restraints    Pre-Treatment Position sitting in chair/recliner  -KM sitting in chair/recliner  -BP sitting in chair/recliner  -BP    Post Treatment Position bed  -KM chair  -BP chair  -BP    In Bed supine;encouraged to call for assist;call light within reach;side rails up x2  -KM      In Chair  reclined;call light within reach;encouraged to call for assist  -BP reclined;call light within reach;encouraged to call for assist  -BP    Recorded by [KM] Ruth Mcmahon PTA [BP] Nunu Dickinson, PT [BP] Nunu Dickinson, PT      User Key  (r) = Recorded By, (t) = Taken By, (c) = Cosigned By    Initials Name Effective Dates    KM Ruth Mcmahon, PTA 08/11/15 -     BP Nunu Dickinson, PT 12/01/15 -                 IP PT Goals       04/05/17 1118          Bed Mobility PT STG    Bed Mobility PT STG, Date Established 04/05/17  -JW      Bed Mobility PT STG, Time to Achieve 3 days  -JW      Bed Mobility PT STG, Activity Type all bed mobility  -JW      Bed Mobility PT STG, [place] Level supervision required  -JW      Transfer Training PT STG    Transfer Training PT STG, Date Established 04/05/17  -JW      Transfer Training PT STG, Time to Achieve 3 days  -JW      Transfer Training PT STG, Activity Type all transfers  -JW      Transfer Training PT STG, Gem Level supervision required  -JW      Transfer Training PT STG, Assist Device walker, rolling  -JW       Gait Training PT STG    Gait Training Goal PT STG, Date Established 04/05/17  -JW      Gait Training Goal PT STG, Time to Achieve 3 days  -JW      Gait Training Goal PT STG, [place] Level supervision required  -JW      Gait Training Goal PT STG, Assist Device walker, rolling  -JW      Gait Training Goal PT STG, Distance to Achieve 200  -JW      Stair Training PT STG    Stair Training Goal PT STG, Date Established 04/05/17  -JW      Stair Training Goal PT STG, Time to Achieve 3 days  -JW      Stair Training Goal PT STG, Number of Steps 1  -JW      Stair Training Goal PT STG, [place] Level contact guard assist  -JW      Stair Training Goal PT STG, Assist Device 1 handrail  -JW      Patient Education PT STG    Patient Education PT STG, Date Established 04/05/17  -JW      Patient Education PT STG, Time to Achieve 3 days  -JW      Patient Education PT STG, Education Type written program;HEP  -JW      Patient Education PT STG, Education Understanding demonstrate adequately;verbalize understanding  -JW        User Key  (r) = Recorded By, (t) = Taken By, (c) = Cosigned By    Initials Name Provider Type    AMADO Tran, PT Physical Therapist          Physical Therapy Education     Title: PT OT SLP Therapies (Done)     Topic: Physical Therapy (Done)     Point: Mobility training (Done)    Learning Progress Summary    Learner Readiness Method Response Comment Documented by Status   Patient Acceptance E,TB,D,H JOHNSON ARIZA 04/06/17 1507 Done    Acceptance E VU  BP 04/06/17 1043 Done    Acceptance E VU  BP 04/05/17 1430 Done    Acceptance E VU  JW 04/05/17 1118 Done               Point: Home exercise program (Done)    Learning Progress Summary    Learner Readiness Method Response Comment Documented by Status   Patient Acceptance E,TB,D,H JOHNSON ARIZA 04/06/17 1507 Done    Acceptance E VU  BP 04/06/17 1043 Done    Acceptance E VU  BP 04/05/17 1430 Done    Acceptance E VU  JW 04/05/17 1118 Done                      User  Key     Initials Effective Dates Name Provider Type Discipline    KM 08/11/15 -  Ruth Mcmahon, PTA Physical Therapy Assistant PT    BP 12/01/15 -  Nunu Dickinson, PT Physical Therapist PT    JW 12/01/15 -  Yissel Tran, PT Physical Therapist PT              Pt states after TKR on LLE  Last year she had only 4 PT sessions and did the rest on her own because [facility] PT cancelled the rest.   Home Health unable to come to her house because a four wheel drive vehicle is needed to reach her house.  States she has to have late appts due to transportation.  Pt voiced concerns that the outpt PT she did have had her using 40# with ex and was concerned that that was too much one week post op.(and she had difficulty performing ex)   Encouraged her to voice her concerns to Dr. Yarbrough.    Reviewed written HEP with pt and discussed progression of ex in case she is unable to go for outpt PT.  Pt fatigues quickly.  Pt reports she used a strap at home after last TKR to help with moving LE.  Encouraged her to try to not use strap and allow the leg to move for itself(and to use strap only if needed).  Pt is very motivated to participate with therapy.  Will continue to see until discharge home.      PT Recommendation and Plan  Anticipated Discharge Disposition: home with outpatient services  Planned Therapy Interventions: balance training, bed mobility training, gait training, home exercise program, strengthening, transfer training, stair training  PT Frequency: 2 times/day  Plan of Care Review  Plan Of Care Reviewed With: patient  Progress: improving  Outcome Summary/Follow up Plan: PT: Pt ambulated x 220 ft with rolling walker and assist of one.  Reviewed  written HEP and discussed progression of HEP.            Outcome Measures       04/06/17 1346 04/06/17 0953 04/05/17 1308    How much help from another person do you currently need...    Turning from your back to your side while in flat bed without using  bedrails? 3  -KM 3  -BP 3  -BP    Moving from lying on back to sitting on the side of a flat bed without bedrails? 3  -KM 3  -BP 3  -BP    Moving to and from a bed to a chair (including a wheelchair)? 3  -KM 3  -BP 3  -BP    Standing up from a chair using your arms (e.g., wheelchair, bedside chair)? 3  -KM 3  -BP 3  -BP    Climbing 3-5 steps with a railing? 3  -KM 3  -BP 3  -BP    To walk in hospital room? 3  -KM 3  -BP 3  -BP    AM-PAC 6 Clicks Score 18  -KM 18  -BP 18  -BP    Functional Assessment    Outcome Measure Options AM-PAC 6 Clicks Basic Mobility (PT)  -KM AM-PAC 6 Clicks Basic Mobility (PT)  -BP AM-PAC 6 Clicks Basic Mobility (PT)  -BP      04/05/17 0929 04/05/17 0928       How much help from another person do you currently need...    Turning from your back to your side while in flat bed without using bedrails?  3  -JW     Moving from lying on back to sitting on the side of a flat bed without bedrails?  3  -JW     Moving to and from a bed to a chair (including a wheelchair)?  3  -JW     Standing up from a chair using your arms (e.g., wheelchair, bedside chair)?  3  -JW     Climbing 3-5 steps with a railing?  3  -JW     To walk in hospital room?  3  -JW     AM-PAC 6 Clicks Score  18  -JW     How much help from another is currently needed...    Putting on and taking off regular lower body clothing? 3  -JJ      Bathing (including washing, rinsing, and drying) 3  -JJ      Toileting (which includes using toilet bed pan or urinal) 4  -JJ      Putting on and taking off regular upper body clothing 4  -JJ      Taking care of personal grooming (such as brushing teeth) 4  -JJ      Eating meals 4  -JJ      Score 22  -      Functional Assessment    Outcome Measure Options AM-PAC 6 Clicks Daily Activity (OT)  -JJ AM-PAC 6 Clicks Basic Mobility (PT)  -JW       User Key  (r) = Recorded By, (t) = Taken By, (c) = Cosigned By    Initials Name Provider Type    DAPHNE Mcmahon, GEE Physical Therapy Assistant    JUAN DAVID Blackman  Cassidy Soto, OTR Occupational Therapist    BP Nunu Dickinson, PT Physical Therapist    JW Yissel Tran, PT Physical Therapist           Time Calculation:         PT Charges       04/06/17 1509 04/06/17 1044       Time Calculation    Start Time 1346  -KM 0953  -BP     Stop Time 1434  -KM 1020  -BP     Time Calculation (min) 48 min  -KM 27 min  -BP     PT Received On  04/06/17  -BP     PT - Next Appointment 04/07/17  -KM 04/06/17  -BP       User Key  (r) = Recorded By, (t) = Taken By, (c) = Cosigned By    Initials Name Provider Type    DAPHNE Mcmahon PTA Physical Therapy Assistant    BP Nunu Dickinson, PT Physical Therapist          Therapy Charges for Today     Code Description Service Date Service Provider Modifiers Qty    72940214206 HC GAIT TRAINING EA 15 MIN 4/6/2017 Ruth Mcmahon PTA GP 1    81994515303 HC PT THER PROC EA 15 MIN 4/6/2017 Ruth Mcmahon PTA GP 2          PT G-Codes  Outcome Measure Options: AM-PAC 6 Clicks Basic Mobility (PT)    Ruth Mcmahon PTA  4/6/2017

## 2017-04-06 NOTE — PROGRESS NOTES
Orthopedic Progress Note   Chief Complaint:  Status post right total knee    Subjective     Interval History: Patient postop day 2 doing well.  Moderate pain this morning.  She is afebrile hemodynamically stable with a hemoglobin of 10.4.  Has been ambulatory.          Objective     Vital Signs  Temp:  [97.5 °F (36.4 °C)-98.3 °F (36.8 °C)] 98.3 °F (36.8 °C)  Heart Rate:  [70-77] 70  Resp:  [18] 18  BP: ()/(59-73) 109/71  Body mass index is 39.65 kg/(m^2).    Intake/Output Summary (Last 24 hours) at 04/06/17 0642  Last data filed at 04/05/17 1425   Gross per 24 hour   Intake              410 ml   Output                0 ml   Net              410 ml             Physical Exam:   General: patient awake, alert and cooperative   Cardiovascular: regular rhythm and rate   Pulm: clear to auscultation bilaterally   Abdomen: Benign.  Soft bowel sounds   Extremities: Dressing is changed wound is completely benign.  No erythema no drainage no swelling.   Neurologic: Normal mood and behavior     Results Review:     I reviewed the patient's new clinical results.      WBC No results found for: WBCS   HGB Hemoglobin   Date Value Ref Range Status   04/06/2017 10.4 (L) 12.0 - 16.0 g/dL Final   04/05/2017 11.6 (L) 12.0 - 16.0 g/dL Final      HCT Hematocrit   Date Value Ref Range Status   04/06/2017 31.5 (L) 37.0 - 47.0 % Final   04/05/2017 34.7 (L) 37.0 - 47.0 % Final      Platlets No results found for: LABPLAT     PT/INR:  No results found for: PROTIME/No results found for: INR    Sodium Sodium   Date Value Ref Range Status   04/06/2017 143 136 - 145 mmol/L Final   04/05/2017 140 136 - 145 mmol/L Final      Potassium Potassium   Date Value Ref Range Status   04/06/2017 4.0 3.5 - 5.2 mmol/L Final   04/05/2017 4.1 3.5 - 5.2 mmol/L Final   04/04/2017 3.7 3.5 - 5.2 mmol/L Final      Chloride Chloride   Date Value Ref Range Status   04/06/2017 103 98 - 107 mmol/L Final   04/05/2017 99 98 - 107 mmol/L Final      Bicarbonate No results  found for: PLASMABICARB   BUN BUN   Date Value Ref Range Status   04/06/2017 20 8 - 23 mg/dL Final   04/05/2017 22 8 - 23 mg/dL Final      Creatinine Creatinine   Date Value Ref Range Status   04/06/2017 0.66 0.57 - 1.00 mg/dL Final   04/05/2017 0.84 0.57 - 1.00 mg/dL Final      Calcium Calcium   Date Value Ref Range Status   04/06/2017 7.6 (L) 8.8 - 10.5 mg/dL Final   04/05/2017 8.0 (L) 8.8 - 10.5 mg/dL Final      Magnesium  AST  ALT  Bilirubin, Total  AlkPhos  Albumin    Amylase  Lipase    Radiology: Magnesium   Date Value Ref Range Status   04/06/2017 2.1 1.7 - 2.5 mg/dL Final   04/05/2017 1.6 (L) 1.7 - 2.5 mg/dL Final     No components found for: AST.*  No components found for: ALT.*  No components found for: BILIRUBIN, TOTAL.*    No components found for: ALKPHOS.*  No components found for: ALBUMIN.*      No components found for: AMYLASE.*  No components found for: LIPASE.*            Imaging Results (most recent)     Procedure Component Value Units Date/Time    XR Knee 1 or 2 View Right [[id number]] Collected:  04/04/17 1628     Updated:  04/04/17 1632    Narrative:       EXAM: 2 views of the right knee.     DATE: 04/04/2017     HISTORY:: Right knee replacement today.     COMPARISON: 2 views the right knee 03/16/2017.     FINDINGS: Total right knee replacement changes are present. The  prosthesis appears appropriately seated. No periprosthetic fracture  is  seen. The knee joint appears appropriately aligned. Surgical drains in  place within the anterior soft tissues. Expected postoperative  subcutaneous air and soft tissue swelling.       Impression:       1. Satisfactory postoperative appearance status post right knee  replacement.     This report was finalized on 4/4/2017 4:29 PM by Dr. Ny Cameron MD.                       Assessment/Plan     Patient Active Problem List   Diagnosis Code   • Left knee DJD M17.9   • Osteoarthritis of right knee M17.9       Continue PT OT today.      Jerry Yarbrough  MD  04/06/17  6:42 AM

## 2017-04-06 NOTE — PROGRESS NOTES
"SERVICE: [facility] HOSPITALIST    CONSULTANTS: Ortho    CHIEF COMPLAINT: f/u RTKA    SUBJECTIVE: The patient reports pain is much better controlled today, but had high pain level overnight. She Denies f/c/cough/soa/chest pain/n/v/d/abdominal pain or other new concerns. Home tomorrow with outpatient therapy.     OBJECTIVE:    /62 (BP Location: Left arm, Patient Position: Sitting)  Pulse 74  Temp 98 °F (36.7 °C) (Oral)   Resp 16  Ht 64\" (162.6 cm)  Wt 231 lb (105 kg)  LMP  (LMP Unknown)  SpO2 91%  BMI 39.65 kg/m2    MEDS/LABS REVIEWED AND ORDERED    acetaminophen 1,000 mg Oral Q6H   aspirin 325 mg Oral Q12H   celecoxib 200 mg Oral Daily   metoprolol tartrate 100 mg Oral Q12H   potassium chloride 20 mEq Oral TID   pregabalin 75 mg Oral Q12H   sennosides-docusate sodium 2 tablet Oral BID   tranexamic acid 1,000 mg Intravenous Once     Physical Exam   Constitutional: She is oriented to person, place, and time. She appears well-developed and well-nourished.   Obese       HENT:   Head: Normocephalic and atraumatic.   Eyes: EOM are normal. Pupils are equal, round, and reactive to light.   Cardiovascular: Normal rate, regular rhythm and normal heart sounds.  Exam reveals no gallop and no friction rub.    No murmur heard.  Pulmonary/Chest: Effort normal and breath sounds normal. No respiratory distress. She has no wheezes. She has no rales.   Abdominal: Soft. Bowel sounds are normal. She exhibits no distension. There is no tenderness. There is no rebound and no guarding.   Musculoskeletal:   Right knee very mildly edematous, no erythema. Incision well-approximated without drainage noted.    Neurological: She is alert and oriented to person, place, and time.   Skin: Skin is warm and dry. No erythema.   Psychiatric: She has a normal mood and affect. Her behavior is normal. Judgment and thought content normal.   Vitals reviewed.    LAB/DIAGNOSTICS:    Lab Results (last 24 hours)     Procedure " Component Value Units Date/Time    CBC & Differential [[id number]] Collected:  04/06/17 0357    Specimen:  Blood Updated:  04/06/17 0416    Narrative:       The following orders were created for panel order CBC & Differential.  Procedure                               Abnormality         Status                     ---------                               -----------         ------                     CBC Auto Differential[[id number]]         Abnormal            Final result                 Please view results for these tests on the individual orders.    CBC Auto Differential [[id number]]  (Abnormal) Collected:  04/06/17 0357    Specimen:  Blood Updated:  04/06/17 0416     WBC 14.42 (H) 10*3/mm3      RBC 3.54 (L) 10*6/mm3      Hemoglobin 10.4 (L) g/dL      Hematocrit 31.5 (L) %      MCV 89.0 fL      MCH 29.4 pg      MCHC 33.0 g/dL      RDW 13.6 %      RDW-SD 44.2 fl      MPV 9.7 fL      Platelets 188 10*3/mm3      Neutrophil % 57.3 %      Lymphocyte % 31.3 %      Monocyte % 8.4 (H) %      Eosinophil % 1.5 %      Basophil % 0.3 %      Immature Grans % 1.2 (H) %      Neutrophils, Absolute 8.26 10*3/mm3      Lymphocytes, Absolute 4.51 10*3/mm3      Monocytes, Absolute 1.21 (H) 10*3/mm3      Eosinophils, Absolute 0.22 10*3/mm3      Basophils, Absolute 0.05 10*3/mm3      Immature Grans, Absolute 0.17 (H) 10*3/mm3      nRBC 0.0 /100 WBC     Basic Metabolic Panel [[id number]]  (Abnormal) Collected:  04/06/17 0357    Specimen:  Blood Updated:  04/06/17 0439     Glucose 113 (H) mg/dL      BUN 20 mg/dL      Creatinine 0.66 mg/dL      Sodium 143 mmol/L      Potassium 4.0 mmol/L      Chloride 103 mmol/L      CO2 29.4 (H) mmol/L      Calcium 7.6 (L) mg/dL      eGFR Non African Amer 91 mL/min/1.73      BUN/Creatinine Ratio 30.3 (H)     Anion Gap 10.6 mmol/L     Narrative:       GFR Normal >60  Chronic Kidney Disease <60  Kidney Failure <15    Magnesium [[id number]]  (Normal) Collected:  04/06/17 0357    Specimen:  Blood Updated:  04/06/17  0441     Magnesium 2.1 mg/dL         ASSESSMENT/PLAN:  1. S/P RTKA secondary to end stage degenerative arthritis: Dr. Yarbrough managing  Pain/PT/OT/DVT prophy with  mg every 12 hours per Dr. Yabrrough  Difficult night last night with pain control, increased meds per Dr. Yarbrough     2. Hypertension:   BP at goal on metoprolol tartrate 100 mg every 12 hours  Monitor     3. Obesity:  Body mass index is 39.65 kg/(m^2).   Nutrition following     4. H/O hypokalemia/hypomagnesemia:   Continued on home three times daily potassium-normal this am  Magnesium normal after IV replacement yesterday     5. Chronic leukocytosis:  Afebrile, no acute findings, present back to 8/2016  Plan f/u with Hem/Onc after discharge.      AD in am

## 2017-04-06 NOTE — PLAN OF CARE
Problem: Patient Care Overview (Adult)  Goal: Plan of Care Review  Outcome: Ongoing (interventions implemented as appropriate)    04/06/17 1307   Coping/Psychosocial Response Interventions   Plan Of Care Reviewed With patient   Patient Care Overview   Progress progress toward functional goals as expected       Goal: Adult Individualization and Mutuality  Outcome: Ongoing (interventions implemented as appropriate)  Goal: Discharge Needs Assessment  Outcome: Ongoing (interventions implemented as appropriate)    04/06/17 1307   Discharge Needs Assessment   Discharge Planning Comments Pt will be d/c'd tomorrow with OP PT         Problem: Perioperative Period (Adult)  Goal: Signs and Symptoms of Listed Potential Problems Will be Absent or Manageable (Perioperative Period)  Outcome: Ongoing (interventions implemented as appropriate)    04/06/17 1307   Perioperative Period   Problems Assessed (Perioperative Period) all   Problems Present (Perioperative Period) pain         Problem: Fall Risk (Adult)  Goal: Absence of Falls  Outcome: Ongoing (interventions implemented as appropriate)    04/06/17 1307   Fall Risk (Adult)   Absence of Falls making progress toward outcome

## 2017-04-06 NOTE — PLAN OF CARE
Problem: Patient Care Overview (Adult)  Goal: Plan of Care Review  Outcome: Ongoing (interventions implemented as appropriate)    04/06/17 1508   Coping/Psychosocial Response Interventions   Plan Of Care Reviewed With patient   Patient Care Overview   Progress improving   Outcome Evaluation   Outcome Summary/Follow up Plan PT: Pt ambulated x 220 ft with rolling walker and assist of one. Reviewed written HEP and discussed progression of HEP.

## 2017-04-06 NOTE — PLAN OF CARE
Problem: Patient Care Overview (Adult)  Goal: Plan of Care Review  Outcome: Ongoing (interventions implemented as appropriate)    04/06/17 0508   Coping/Psychosocial Response Interventions   Plan Of Care Reviewed With patient   Patient Care Overview   Progress improving       Goal: Adult Individualization and Mutuality  Outcome: Ongoing (interventions implemented as appropriate)    04/06/17 0508   Individualization   Patient Specific Preferences none voiced       Goal: Discharge Needs Assessment  Outcome: Ongoing (interventions implemented as appropriate)    04/06/17 0508   Discharge Needs Assessment   Concerns To Be Addressed discharge planning concerns   Readmission Within The Last 30 Days no previous admission in last 30 days   Self-Care   Equipment Currently Used at Home none   Living Environment   Transportation Available family or friend will provide         Problem: Perioperative Period (Adult)  Goal: Signs and Symptoms of Listed Potential Problems Will be Absent or Manageable (Perioperative Period)  Outcome: Ongoing (interventions implemented as appropriate)    04/06/17 0508   Perioperative Period   Problems Assessed (Perioperative Period) pain   Problems Present (Perioperative Period) pain         Problem: Fall Risk (Adult)  Goal: Identify Related Risk Factors and Signs and Symptoms  Outcome: Ongoing (interventions implemented as appropriate)    04/06/17 0508   Fall Risk   Fall Risk: Related Risk Factors gait/mobility problems   Fall Risk: Signs and Symptoms presence of risk factors       Goal: Absence of Falls  Outcome: Ongoing (interventions implemented as appropriate)    04/06/17 0508   Fall Risk (Adult)   Absence of Falls making progress toward outcome

## 2017-04-07 VITALS
TEMPERATURE: 97.4 F | OXYGEN SATURATION: 95 % | HEIGHT: 64 IN | HEART RATE: 77 BPM | DIASTOLIC BLOOD PRESSURE: 72 MMHG | BODY MASS INDEX: 39.44 KG/M2 | RESPIRATION RATE: 18 BRPM | WEIGHT: 231 LBS | SYSTOLIC BLOOD PRESSURE: 110 MMHG

## 2017-04-07 LAB
ANION GAP SERPL CALCULATED.3IONS-SCNC: 8.1 MMOL/L
BASOPHILS # BLD AUTO: 0.03 10*3/MM3 (ref 0–0.2)
BASOPHILS NFR BLD AUTO: 0.2 % (ref 0–2)
BUN BLD-MCNC: 15 MG/DL (ref 8–23)
BUN/CREAT SERPL: 24.2 (ref 7–25)
CALCIUM SPEC-SCNC: 7.9 MG/DL (ref 8.8–10.5)
CHLORIDE SERPL-SCNC: 103 MMOL/L (ref 98–107)
CO2 SERPL-SCNC: 30.9 MMOL/L (ref 22–29)
CREAT BLD-MCNC: 0.62 MG/DL (ref 0.57–1)
DEPRECATED RDW RBC AUTO: 46.1 FL (ref 37–54)
EOSINOPHIL # BLD AUTO: 0.4 10*3/MM3 (ref 0.1–0.3)
EOSINOPHIL NFR BLD AUTO: 3 % (ref 0–4)
ERYTHROCYTE [DISTWIDTH] IN BLOOD BY AUTOMATED COUNT: 14 % (ref 11.5–14.5)
GFR SERPL CREATININE-BSD FRML MDRD: 98 ML/MIN/1.73
GLUCOSE BLD-MCNC: 97 MG/DL (ref 65–99)
HCT VFR BLD AUTO: 31.5 % (ref 37–47)
HGB BLD-MCNC: 10.3 G/DL (ref 12–16)
IMM GRANULOCYTES # BLD: 0.19 10*3/MM3 (ref 0–0.03)
IMM GRANULOCYTES NFR BLD: 1.4 % (ref 0–0.5)
LYMPHOCYTES # BLD AUTO: 4.6 10*3/MM3 (ref 0.6–4.8)
LYMPHOCYTES NFR BLD AUTO: 34.3 % (ref 20–45)
MAGNESIUM SERPL-MCNC: 2 MG/DL (ref 1.7–2.5)
MCH RBC QN AUTO: 29.5 PG (ref 27–31)
MCHC RBC AUTO-ENTMCNC: 32.7 G/DL (ref 31–37)
MCV RBC AUTO: 90.3 FL (ref 81–99)
MONOCYTES # BLD AUTO: 1 10*3/MM3 (ref 0–1)
MONOCYTES NFR BLD AUTO: 7.4 % (ref 3–8)
NEUTROPHILS # BLD AUTO: 7.21 10*3/MM3 (ref 1.5–8.3)
NEUTROPHILS NFR BLD AUTO: 53.7 % (ref 45–70)
NRBC BLD MANUAL-RTO: 0 /100 WBC (ref 0–0)
PLATELET # BLD AUTO: 183 10*3/MM3 (ref 140–500)
PMV BLD AUTO: 9.9 FL (ref 7.4–10.4)
POTASSIUM BLD-SCNC: 4.1 MMOL/L (ref 3.5–5.2)
RBC # BLD AUTO: 3.49 10*6/MM3 (ref 4.2–5.4)
SODIUM BLD-SCNC: 142 MMOL/L (ref 136–145)
WBC NRBC COR # BLD: 13.43 10*3/MM3 (ref 4.8–10.8)

## 2017-04-07 PROCEDURE — 97110 THERAPEUTIC EXERCISES: CPT

## 2017-04-07 PROCEDURE — 85025 COMPLETE CBC W/AUTO DIFF WBC: CPT | Performed by: ORTHOPAEDIC SURGERY

## 2017-04-07 PROCEDURE — 99238 HOSP IP/OBS DSCHRG MGMT 30/<: CPT | Performed by: NURSE PRACTITIONER

## 2017-04-07 PROCEDURE — 99024 POSTOP FOLLOW-UP VISIT: CPT | Performed by: ORTHOPAEDIC SURGERY

## 2017-04-07 PROCEDURE — 83735 ASSAY OF MAGNESIUM: CPT | Performed by: NURSE PRACTITIONER

## 2017-04-07 PROCEDURE — 80048 BASIC METABOLIC PNL TOTAL CA: CPT | Performed by: NURSE PRACTITIONER

## 2017-04-07 RX ORDER — GABAPENTIN 300 MG/1
300 CAPSULE ORAL 3 TIMES DAILY
Qty: 90 CAPSULE | Refills: 1 | Status: SHIPPED | OUTPATIENT
Start: 2017-04-07 | End: 2019-11-26

## 2017-04-07 RX ORDER — OXYCODONE AND ACETAMINOPHEN 10; 325 MG/1; MG/1
1 TABLET ORAL EVERY 4 HOURS PRN
Qty: 50 TABLET | Refills: 0 | Status: SHIPPED | OUTPATIENT
Start: 2017-04-07 | End: 2017-04-17

## 2017-04-07 RX ORDER — ASPIRIN 325 MG
325 TABLET ORAL EVERY 12 HOURS
Qty: 60 TABLET | Refills: 0 | Status: SHIPPED | OUTPATIENT
Start: 2017-04-07 | End: 2017-04-17

## 2017-04-07 RX ADMIN — ACETAMINOPHEN 1000 MG: 500 TABLET ORAL at 06:23

## 2017-04-07 RX ADMIN — DOCUSATE SODIUM AND SENNOSIDES 2 TABLET: 8.6; 5 TABLET, FILM COATED ORAL at 08:58

## 2017-04-07 RX ADMIN — OXYCODONE HYDROCHLORIDE 15 MG: 5 TABLET ORAL at 08:58

## 2017-04-07 RX ADMIN — POTASSIUM CHLORIDE 20 MEQ: 20 TABLET, EXTENDED RELEASE ORAL at 08:59

## 2017-04-07 RX ADMIN — PREGABALIN 75 MG: 75 CAPSULE ORAL at 08:58

## 2017-04-07 RX ADMIN — ACETAMINOPHEN 1000 MG: 500 TABLET ORAL at 00:37

## 2017-04-07 RX ADMIN — CELECOXIB 200 MG: 200 CAPSULE ORAL at 08:58

## 2017-04-07 RX ADMIN — OXYCODONE HYDROCHLORIDE 15 MG: 5 TABLET ORAL at 04:25

## 2017-04-07 RX ADMIN — ASPIRIN 325 MG: 325 TABLET ORAL at 06:23

## 2017-04-07 RX ADMIN — METOPROLOL TARTRATE 100 MG: 50 TABLET, FILM COATED ORAL at 08:58

## 2017-04-07 NOTE — PROGRESS NOTES
Orthopedic Progress Note   Chief Complaint:  Status post right total knee    Subjective     Interval History: Patient is postop day 3 doing well.  He is afebrile hemodynamically stable.  Hemoglobin is 10.3.  She is independent with her gait with a walker.  Pain was well-controlled oral medication.  Ready for discharge           Objective     Vital Signs  Temp:  [97.4 °F (36.3 °C)-98.8 °F (37.1 °C)] 97.4 °F (36.3 °C)  Heart Rate:  [73-77] 77  Resp:  [16-18] 18  BP: (110-120)/(62-73) 110/72  Body mass index is 39.65 kg/(m^2).    Intake/Output Summary (Last 24 hours) at 04/07/17 0630  Last data filed at 04/06/17 1759   Gross per 24 hour   Intake              720 ml   Output                0 ml   Net              720 ml             Physical Exam:   General: patient awake, alert and cooperative   Cardiovascular: regular rhythm and rate   Pulm: clear to auscultation bilaterally   Abdomen: Benign.  Soft bowel sounds   Extremities: Wound is benign.  Good distal pulses no motor deficit   Neurologic: Normal mood and behavior     Results Review:     I reviewed the patient's new clinical results.      WBC No results found for: WBCS   HGB Hemoglobin   Date Value Ref Range Status   04/07/2017 10.3 (L) 12.0 - 16.0 g/dL Final   04/06/2017 10.4 (L) 12.0 - 16.0 g/dL Final   04/05/2017 11.6 (L) 12.0 - 16.0 g/dL Final      HCT Hematocrit   Date Value Ref Range Status   04/07/2017 31.5 (L) 37.0 - 47.0 % Final   04/06/2017 31.5 (L) 37.0 - 47.0 % Final   04/05/2017 34.7 (L) 37.0 - 47.0 % Final      Platlets No results found for: LABPLAT     PT/INR:  No results found for: PROTIME/No results found for: INR    Sodium Sodium   Date Value Ref Range Status   04/07/2017 142 136 - 145 mmol/L Final   04/06/2017 143 136 - 145 mmol/L Final   04/05/2017 140 136 - 145 mmol/L Final      Potassium Potassium   Date Value Ref Range Status   04/07/2017 4.1 3.5 - 5.2 mmol/L Final   04/06/2017 4.0 3.5 - 5.2 mmol/L Final   04/05/2017 4.1 3.5 - 5.2 mmol/L  Final   04/04/2017 3.7 3.5 - 5.2 mmol/L Final      Chloride Chloride   Date Value Ref Range Status   04/07/2017 103 98 - 107 mmol/L Final   04/06/2017 103 98 - 107 mmol/L Final   04/05/2017 99 98 - 107 mmol/L Final      Bicarbonate No results found for: PLASMABICARB   BUN BUN   Date Value Ref Range Status   04/07/2017 15 8 - 23 mg/dL Final   04/06/2017 20 8 - 23 mg/dL Final   04/05/2017 22 8 - 23 mg/dL Final      Creatinine Creatinine   Date Value Ref Range Status   04/07/2017 0.62 0.57 - 1.00 mg/dL Final   04/06/2017 0.66 0.57 - 1.00 mg/dL Final   04/05/2017 0.84 0.57 - 1.00 mg/dL Final      Calcium Calcium   Date Value Ref Range Status   04/07/2017 7.9 (L) 8.8 - 10.5 mg/dL Final   04/06/2017 7.6 (L) 8.8 - 10.5 mg/dL Final   04/05/2017 8.0 (L) 8.8 - 10.5 mg/dL Final      Magnesium  AST  ALT  Bilirubin, Total  AlkPhos  Albumin    Amylase  Lipase    Radiology: Magnesium   Date Value Ref Range Status   04/07/2017 2.0 1.7 - 2.5 mg/dL Final   04/06/2017 2.1 1.7 - 2.5 mg/dL Final   04/05/2017 1.6 (L) 1.7 - 2.5 mg/dL Final     No components found for: AST.*  No components found for: ALT.*  No components found for: BILIRUBIN, TOTAL.*    No components found for: ALKPHOS.*  No components found for: ALBUMIN.*      No components found for: AMYLASE.*  No components found for: LIPASE.*            Imaging Results (most recent)     Procedure Component Value Units Date/Time    XR Knee 1 or 2 View Right [[id number]] Collected:  04/04/17 1628     Updated:  04/04/17 1632    Narrative:       EXAM: 2 views of the right knee.     DATE: 04/04/2017     HISTORY:: Right knee replacement today.     COMPARISON: 2 views the right knee 03/16/2017.     FINDINGS: Total right knee replacement changes are present. The  prosthesis appears appropriately seated. No periprosthetic fracture  is  seen. The knee joint appears appropriately aligned. Surgical drains in  place within the anterior soft tissues. Expected postoperative  subcutaneous air and soft  tissue swelling.       Impression:       1. Satisfactory postoperative appearance status post right knee  replacement.     This report was finalized on 4/4/2017 4:29 PM by Dr. Ny Cameron MD.                       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  We'll leave prescription for Neurontin to assist with pain control.  Patient will use aspirin twice a day for DVT prophylaxis.  She has follow-up appointment already scheduled.      Jerry Yarbrough MD  04/07/17  6:53 AM

## 2017-04-07 NOTE — DISCHARGE INSTR - APPOINTMENTS
Follow up with Dr. Cam on April 14th at 3:45    Bring Insurance card, 's license, and list of medications to appt.-patient will be sent a new patient packet to fill out-Hematology/oncology appt. Will be at [facility] and they said to park at the urgent care entrance. Labs at 9:00 a.m. On April 20th and then appointment at 9:40.

## 2017-04-07 NOTE — PLAN OF CARE
Problem: Inpatient Physical Therapy  Goal: Bed Mobility Goal STG- PT  Outcome: Outcome(s) achieved Date Met:  04/07/17 04/05/17 1118 04/07/17 1034   Bed Mobility PT STG   Bed Mobility PT STG, Date Established 04/05/17 --    Bed Mobility PT STG, Time to Achieve 3 days --    Bed Mobility PT STG, Activity Type all bed mobility --    Bed Mobility PT STG, [place] Level supervision required --    Bed Mobility PT STG, Date Goal Reviewed --  04/07/17   Bed Mobility PT STG, Outcome --  goal met       Goal: Transfer Training Goal 1 STG- PT  Outcome: Outcome(s) achieved Date Met:  04/07/17 04/05/17 1118 04/07/17 1034   Transfer Training PT STG   Transfer Training PT STG, Date Established 04/05/17 --    Transfer Training PT STG, Time to Achieve 3 days --    Transfer Training PT STG, Activity Type all transfers --    Transfer Training PT STG, [place] Level supervision required --    Transfer Training PT STG, Assist Device walker, rolling --    Transfer Training PT STG, Date Goal Reviewed --  04/07/17   Transfer Training PT STG, Outcome --  goal met       Goal: Gait Training Goal STG- PT  Outcome: Outcome(s) achieved Date Met:  04/07/17 04/05/17 1118 04/07/17 1034   Gait Training PT STG   Gait Training Goal PT STG, Date Established 04/05/17 --    Gait Training Goal PT STG, Time to Achieve 3 days --    Gait Training Goal PT STG, [place] Level supervision required --    Gait Training Goal PT STG, Assist Device walker, rolling --    Gait Training Goal PT STG, Distance to Achieve 200 --    Gait Training Goal PT STG, Date Goal Reviewed --  04/07/17   Gait Training Goal PT STG, Outcome --  goal met       Goal: Stair Training Goal STG- PT  Outcome: Outcome(s) achieved Date Met:  04/07/17 04/05/17 1118 04/07/17 1034   Stair Training PT STG   Stair Training Goal PT STG, Date Established 04/05/17 --    Stair Training Goal PT STG, Time to Achieve 3 days --    Stair Training Goal PT STG, Number of Steps 1 --     Stair Training Goal PT STG, [place] Level contact guard assist --    Stair Training Goal PT STG, Assist Device 1 handrail --    Stair Training Goal PT STG, Date Goal Reviewed --  04/07/17   Stair Training Goal PT STG, Outcome --  goal met       Goal: Patient Education Goal STG- PT  Outcome: Outcome(s) achieved Date Met:  04/07/17 04/05/17 1118 04/07/17 1034   Patient Education PT STG   Patient Education PT STG, Date Established 04/05/17 --    Patient Education PT STG, Time to Achieve 3 days --    Patient Education PT STG, Education Type written program;HEP --    Patient Education PT STG, Education Understanding demonstrate adequately;verbalize understanding --    Patient Education PT STG, Date Goal Reviewed --  04/07/17   Patient Education PT STG Outcome --  goal met

## 2017-04-07 NOTE — DISCHARGE SUMMARY
Liana Patel  1955  [id number]    Hospitalists Discharge Summary    Date of Admission: 4/4/2017  Date of Discharge:  4/7/2017    Primary Discharge Diagnoses:   1. S/P RTKA secondary to end stage degenerative arthritis  Secondary Discharge Diagnoses:     2. Hypertension   3. Obesity    4. H/O hypokalemia/hypomagnesemia   5. Chronic leukocytosis  PCP  Patient Care Team:  Cole Cam MD as PCP - General (Family Medicine)    Consults:   Consults     No orders found from 3/6/2017 to 4/5/2017.        Operations and Procedures Performed:  Procedure(s):  TOTAL KNEE ARTHROPLASTY, lc, orthoalign     Xr Chest 2 Vw    Result Date: 3/28/2017  Narrative: INDICATION:  Preoperative assessment for knee surgery. Preoperative assessment pulmonary function.  COMPARISON:  09/14/2016  FINDINGS: PA and lateral views of the chest.  Heart and mediastinal contours are normal.  The lungs are clear.  No pneumothorax or pleural effusion.      Impression: Normal chest radiograph.  This report was finalized on 3/28/2017 9:42 AM by Dr. Mando Daley MD.      Xr Knee 1 Or 2 View Right    Result Date: 4/4/2017  Narrative: EXAM: 2 views of the right knee.  DATE: 04/04/2017  HISTORY:: Right knee replacement today.  COMPARISON: 2 views the right knee 03/16/2017.  FINDINGS: Total right knee replacement changes are present. The prosthesis appears appropriately seated. No periprosthetic fracture  is seen. The knee joint appears appropriately aligned. Surgical drains in place within the anterior soft tissues. Expected postoperative subcutaneous air and soft tissue swelling.      Impression: 1. Satisfactory postoperative appearance status post right knee replacement.  This report was finalized on 4/4/2017 4:29 PM by Dr. Ny Cameron MD.      Allergies:  is allergic to bee venom; latex; and penicillins.    Jae  Oxycodone 10/2016 per report of 4/4/17    Discharge Medications:   Liana Patel   Home Medication Instructions  "THIERRY:[id number]    Printed on:04/07/17 7303   Medication Information                      aspirin 325 MG tablet  Take 1 tablet by mouth Every 12 (Twelve) Hours.             gabapentin (NEURONTIN) 300 MG capsule  Take 1 capsule by mouth 3 (Three) Times a Day.             metoprolol tartrate (LOPRESSOR) 100 MG tablet  Take 100 mg by mouth 2 (Two) Times a Day.             Multiple Vitamins-Minerals (MULTIVITAMIN WITH MINERALS) tablet tablet  Take 1 tablet by mouth Every Morning.             oxyCODONE-acetaminophen (PERCOCET)  MG per tablet  Take 1 tablet by mouth Every 4 (Four) Hours As Needed for Moderate Pain (4-6).             potassium chloride (K-DUR,KLOR-CON) 20 MEQ CR tablet  Take 20 mEq by mouth 3 (Three) Times a Day.               History of Present Illness: Taken from original HPI on admit per Dr. Smallwood:  \"62 y/o female with hypertension, DJD and OA, Obesity and h/o nephrolithiasis (last stone 10 years ago) presented to the hospital today to undergo right total knee arthroplasty secondary to end stage degenerative arthritis of the right knee that failed to respond to non-operative management. The patient failed to respond to nonsteroidal anti-inflammatories, cortisone injection and viscus supplement injections and modification of activity. She underwent successful left TKA in September of last year. The patient is now post op and notes she is feeling well. No recent illness (had the flu about a month ago but all symptoms resolved). No pain currently as block still in effect. Patient denies any CP, SOA or heart palpitations. Denies any F/C, N/V/D. Denies LE edema or lightheadedness. Was in her usual state of health prior to surgery.\"     Hospital Course  1. S/P RTKA secondary to end stage degenerative arthritis: Dr. Yarbrough managed throughout  Pain/PT/OT/DVT prophy with  mg every 12 hours per Dr. Yarbrough  F/U 2 weeks      2. Hypertension:   BP at goal on metoprolol tartrate 100 mg every 12 " hours  Continue to HOLD amlodipine pending re-evaluation by Cole Cam MD  Monitor, and log BP and bring to appointment     3. Obesity:  Body mass index is 39.65 kg/(m^2).   Nutrition followed throughout      4. H/O hypokalemia/hypomagnesemia:   Potassium and magnesium normal with continuation of three times daily potassium per home regimen an one time IV replacement of magnesium.  F/U Cole Cam MD     5. Chronic leukocytosis:  Noted in records back to 8/2016, asymptomatic, afebrile  Schedule appointment with Hem/Onc in 2 weeks    Last Lab Results:   Lab Results (most recent)     Procedure Component Value Units Date/Time    Potassium [[id number]]  (Normal) Collected:  04/04/17 1123    Specimen:  Blood Updated:  04/04/17 1141     Potassium 3.7 mmol/L     Magnesium [[id number]]  (Abnormal) Collected:  04/05/17 0449    Specimen:  Blood Updated:  04/05/17 0543     Magnesium 1.6 (L) mg/dL     Basic Metabolic Panel [[id number]]  (Abnormal) Collected:  04/05/17 0449    Specimen:  Blood Updated:  04/05/17 0547     Glucose 133 (H) mg/dL      BUN 22 mg/dL      Creatinine 0.84 mg/dL      Sodium 140 mmol/L      Potassium 4.1 mmol/L      Chloride 99 mmol/L      CO2 28.6 mmol/L      Calcium 8.0 (L) mg/dL      eGFR Non African Amer 69 mL/min/1.73      BUN/Creatinine Ratio 26.2 (H)     Anion Gap 12.4 mmol/L     Narrative:       GFR Normal >60  Chronic Kidney Disease <60  Kidney Failure <15    aPTT [[id number]]  (Normal) Collected:  04/05/17 0449    Specimen:  Blood Updated:  04/05/17 0600     PTT 28.2 seconds     Narrative:       PTT = The equivalent PTT values for the therapeutic range of heparin levels at 0.1 to 0.7 U/ml are 53 to 110 seconds.    CBC & Differential [[id number]] Collected:  04/05/17 0449    Specimen:  Blood Updated:  04/05/17 0625    Narrative:       The following orders were created for panel order CBC & Differential.  Procedure                               Abnormality         Status                      ---------                               -----------         ------                     Manual Differential[[id number]]           Abnormal            Final result               CBC Auto Differential[[id number]]         Abnormal            Final result                 Please view results for these tests on the individual orders.    CBC Auto Differential [[id number]]  (Abnormal) Collected:  04/05/17 0449    Specimen:  Blood Updated:  04/05/17 0625     WBC 20.53 (H) 10*3/mm3      RBC 3.99 (L) 10*6/mm3      Hemoglobin 11.6 (L) g/dL      Hematocrit 34.7 (L) %      MCV 87.0 fL      MCH 29.1 pg      MCHC 33.4 g/dL      RDW 13.2 %      RDW-SD 41.4 fl      MPV 9.7 fL      Platelets 220 10*3/mm3     Manual Differential [[id number]]  (Abnormal) Collected:  04/05/17 0449    Specimen:  Blood Updated:  04/05/17 0625     Neutrophil % 75.0 (H) %      Lymphocyte % 16.0 (L) %      Monocyte % 2.0 (L) %      Bands %  7.0 (H) %      Neutrophils Absolute 16.83 (H) 10*3/mm3      Lymphocytes Absolute 3.28 10*3/mm3      Monocytes Absolute 0.41 10*3/mm3      RBC Morphology Normal     WBC Morphology Normal     Platelet Estimate Adequate    CBC & Differential [[id number]] Collected:  04/06/17 0357    Specimen:  Blood Updated:  04/06/17 0416    Narrative:       The following orders were created for panel order CBC & Differential.  Procedure                               Abnormality         Status                     ---------                               -----------         ------                     CBC Auto Differential[[id number]]         Abnormal            Final result                 Please view results for these tests on the individual orders.    CBC Auto Differential [[id number]]  (Abnormal) Collected:  04/06/17 0357    Specimen:  Blood Updated:  04/06/17 0416     WBC 14.42 (H) 10*3/mm3      RBC 3.54 (L) 10*6/mm3      Hemoglobin 10.4 (L) g/dL      Hematocrit 31.5 (L) %      MCV 89.0 fL      MCH 29.4 pg      MCHC 33.0 g/dL      RDW 13.6 %       RDW-SD 44.2 fl      MPV 9.7 fL      Platelets 188 10*3/mm3      Neutrophil % 57.3 %      Lymphocyte % 31.3 %      Monocyte % 8.4 (H) %      Eosinophil % 1.5 %      Basophil % 0.3 %      Immature Grans % 1.2 (H) %      Neutrophils, Absolute 8.26 10*3/mm3      Lymphocytes, Absolute 4.51 10*3/mm3      Monocytes, Absolute 1.21 (H) 10*3/mm3      Eosinophils, Absolute 0.22 10*3/mm3      Basophils, Absolute 0.05 10*3/mm3      Immature Grans, Absolute 0.17 (H) 10*3/mm3      nRBC 0.0 /100 WBC     Basic Metabolic Panel [[id number]]  (Abnormal) Collected:  04/06/17 0357    Specimen:  Blood Updated:  04/06/17 0439     Glucose 113 (H) mg/dL      BUN 20 mg/dL      Creatinine 0.66 mg/dL      Sodium 143 mmol/L      Potassium 4.0 mmol/L      Chloride 103 mmol/L      CO2 29.4 (H) mmol/L      Calcium 7.6 (L) mg/dL      eGFR Non African Amer 91 mL/min/1.73      BUN/Creatinine Ratio 30.3 (H)     Anion Gap 10.6 mmol/L     Narrative:       GFR Normal >60  Chronic Kidney Disease <60  Kidney Failure <15    Magnesium [[id number]]  (Normal) Collected:  04/06/17 0357    Specimen:  Blood Updated:  04/06/17 0441     Magnesium 2.1 mg/dL     CBC & Differential [[id number]] Collected:  04/07/17 0358    Specimen:  Blood Updated:  04/07/17 0431    Narrative:       The following orders were created for panel order CBC & Differential.  Procedure                               Abnormality         Status                     ---------                               -----------         ------                     CBC Auto Differential[[id number]]         Abnormal            Final result                 Please view results for these tests on the individual orders.    CBC Auto Differential [[id number]]  (Abnormal) Collected:  04/07/17 0358    Specimen:  Blood Updated:  04/07/17 0431     WBC 13.43 (H) 10*3/mm3      RBC 3.49 (L) 10*6/mm3      Hemoglobin 10.3 (L) g/dL      Hematocrit 31.5 (L) %      MCV 90.3 fL      MCH 29.5 pg      MCHC 32.7 g/dL      RDW 14.0 %       RDW-SD 46.1 fl      MPV 9.9 fL      Platelets 183 10*3/mm3      Neutrophil % 53.7 %      Lymphocyte % 34.3 %      Monocyte % 7.4 %      Eosinophil % 3.0 %      Basophil % 0.2 %      Immature Grans % 1.4 (H) %      Neutrophils, Absolute 7.21 10*3/mm3      Lymphocytes, Absolute 4.60 10*3/mm3      Monocytes, Absolute 1.00 10*3/mm3      Eosinophils, Absolute 0.40 (H) 10*3/mm3      Basophils, Absolute 0.03 10*3/mm3      Immature Grans, Absolute 0.19 (H) 10*3/mm3      nRBC 0.0 /100 WBC     Basic Metabolic Panel [[id number]]  (Abnormal) Collected:  04/07/17 0358    Specimen:  Blood Updated:  04/07/17 0446     Glucose 97 mg/dL      BUN 15 mg/dL      Creatinine 0.62 mg/dL      Sodium 142 mmol/L      Potassium 4.1 mmol/L      Chloride 103 mmol/L      CO2 30.9 (H) mmol/L      Calcium 7.9 (L) mg/dL      eGFR Non African Amer 98 mL/min/1.73      BUN/Creatinine Ratio 24.2     Anion Gap 8.1 mmol/L     Narrative:       GFR Normal >60  Chronic Kidney Disease <60  Kidney Failure <15    Magnesium [[id number]]  (Normal) Collected:  04/07/17 0358    Specimen:  Blood Updated:  04/07/17 0446     Magnesium 2.0 mg/dL         Imaging Results (most recent)     Procedure Component Value Units Date/Time    XR Knee 1 or 2 View Right [[id number]] Collected:  04/04/17 1628     Updated:  04/04/17 1632    Narrative:       EXAM: 2 views of the right knee.     DATE: 04/04/2017     HISTORY:: Right knee replacement today.     COMPARISON: 2 views the right knee 03/16/2017.     FINDINGS: Total right knee replacement changes are present. The  prosthesis appears appropriately seated. No periprosthetic fracture  is  seen. The knee joint appears appropriately aligned. Surgical drains in  place within the anterior soft tissues. Expected postoperative  subcutaneous air and soft tissue swelling.       Impression:       1. Satisfactory postoperative appearance status post right knee  replacement.     This report was finalized on 4/4/2017 4:29 PM by Dr. Ny Cameron,  MD.           PROCEDURES  Procedure(s):  TOTAL KNEE ARTHROPLASTY, lc, orthoalign    Condition on Discharge:  Stable    Physical Exam at Discharge  Vital Signs  Temp:  [97.4 °F (36.3 °C)-98.8 °F (37.1 °C)] 97.4 °F (36.3 °C)  Heart Rate:  [73-77] 77  Resp:  [16-18] 18  BP: (110-120)/(62-73) 110/72    Physical Exam:  Physical Exam   Constitutional: Patient appears well-developed and well-nourished and in no acute distress, obese   HEENT:   Head: Normocephalic and atraumatic.   Eyes:  Pupils are equal, round, and reactive to light. EOM are intact. Sclera are anicteric and non-injected.  Mouth and Throat: Patient has moist mucous membranes. Oropharynx is clear of any erythema or exudate.     Neck: Neck supple. No JVD present. No thyromegaly present. No lymphadenopathy present.  Cardiovascular: Regular rate, regular rhythm, S1 normal and S2 normal.  Exam reveals no gallop and no friction rub.  No murmur heard.  Pulmonary/Chest: Lungs are clear to auscultation bilaterally. No respiratory distress. No wheezes. No rhonchi. No rales.   Abdominal: Obese, soft. Bowel sounds are normal. No distension and no mass. There is no hepatosplenomegaly. There is no tenderness.   Musculoskeletal: Normal Muscle tone  Extremities: Mild RLE edema. Pulses are palpable in all 4 extremities.  Neurological: Patient is alert and oriented to person, place, and time. Cranial nerves II-XII are grossly intact with no focal deficits.  Skin: Right knee incision well-approximated, clean, dry and intact. Skin is warm. No rash noted. Nails show no clubbing.  No cyanosis or erythema.    Discharge Disposition  Home    Visiting Nurse:    No Outpatient    Home PT/OT:  No Outpatient    Home Safety Evaluation:  No     DME  arranged    Discharge Diet:           Dietary Orders            Start     Ordered    04/04/17 1728  Diet Regular  Diet Effective Now     Question:  Diet Texture / Consistency  Answer:  Regular    04/04/17 1727        Activity at  Discharge:  As per Dr. Yarbrough    Pre-discharge education  Wound Care, medications, follow up    Follow-up Appointments  Future Appointments  Date Time Provider Department Center   4/17/2017 9:45 AM Jerry Yarbrough MD [facility] None     Additional Instructions for the Follow-ups that You Need to Schedule     Additional Discharge Follow-Up (Specify Provider)    As directed    To:  Hematology/Oncology   Follow Up:  2 Weeks       Discharge Follow-up with PCP    As directed    Follow Up Details:  1 week       Discharge Follow-up with Specialty    As directed    Specialty:  Dr. Yarbrough   Follow Up:  2 Weeks               Test Results Pending at Discharge: NONE     Deena Alas, DINESH  04/07/17  8:03 AM    Time: Discharge 30 min (if over 30 minutes give explanation as to why it took greater than 30 minutes)

## 2017-04-07 NOTE — THERAPY DISCHARGE NOTE
Acute Care - Physical Therapy Treatment Note/Discharge   Brandee Cartwright     Patient Name: Liana Patel  : 1955  MRN: [id number]  Today's Date: 2017  Onset of Illness/Injury or Date of Surgery Date: 17  Date of Referral to PT: 17  Referring Physician: Dr Yarbrough    Admit Date: 2017    Visit Dx:    ICD-10-CM ICD-9-CM   1. Primary osteoarthritis of right knee M17.11 715.16     Patient Active Problem List   Diagnosis   • Left knee DJD   • Osteoarthritis of right knee               IP PT Goals       17 1034 17 1118       Bed Mobility PT STG    Bed Mobility PT STG, Date Established  17  -JW     Bed Mobility PT STG, Time to Achieve  3 days  -JW     Bed Mobility PT STG, Activity Type  all bed mobility  -JW     Bed Mobility PT STG, [place] Level  supervision required  -JW     Bed Mobility PT STG, Date Goal Reviewed 17  -JW      Bed Mobility PT STG, Outcome goal met  -JW      Transfer Training PT STG    Transfer Training PT STG, Date Established  17  -JW     Transfer Training PT STG, Time to Achieve  3 days  -JW     Transfer Training PT STG, Activity Type  all transfers  -JW     Transfer Training PT STG, [place] Level  supervision required  -JW     Transfer Training PT STG, Assist Device  walker, rolling  -JW     Transfer Training PT STG, Date Goal Reviewed 17  -JW      Transfer Training PT STG, Outcome goal met  -JW      Gait Training PT STG    Gait Training Goal PT STG, Date Established  17  -JW     Gait Training Goal PT STG, Time to Achieve  3 days  -JW     Gait Training Goal PT STG, [place] Level  supervision required  -JW     Gait Training Goal PT STG, Assist Device  walker, rolling  -JW     Gait Training Goal PT STG, Distance to Achieve  200  -JW     Gait Training Goal PT STG, Date Goal Reviewed 17  -JW      Gait Training Goal PT STG, Outcome goal met  -JW      Stair Training PT STG    Stair Training Goal PT STG, Date Established   04/05/17  -JW     Stair Training Goal PT STG, Time to Achieve  3 days  -JW     Stair Training Goal PT STG, Number of Steps  1  -JW     Stair Training Goal PT STG, [place] Level  contact guard assist  -JW     Stair Training Goal PT STG, Assist Device  1 handrail  -JW     Stair Training Goal PT STG, Date Goal Reviewed 04/07/17  -      Stair Training Goal PT STG, Outcome goal met  -JW      Patient Education PT STG    Patient Education PT STG, Date Established  04/05/17  -JW     Patient Education PT STG, Time to Achieve  3 days  -JW     Patient Education PT STG, Education Type  written program;HEP  -JW     Patient Education PT STG, Education Understanding  demonstrate adequately;verbalize understanding  -JW     Patient Education PT STG, Date Goal Reviewed 04/07/17  -JW      Patient Education PT STG Outcome goal met  -        User Key  (r) = Recorded By, (t) = Taken By, (c) = Cosigned By    Initials Name Provider Type    AMADO Tran, PT Physical Therapist              Adult Rehabilitation Note       04/07/17 0840      Rehab Assessment/Intervention    Discipline physical therapist  -    Document Type discharge summary;therapy note (daily note)  -    Subjective Information agree to therapy;complains of;pain  -JW    Patient Effort, Rehab Treatment excellent  -JW    Symptoms Noted During/After Treatment     Symptoms Noted Comment     Precautions/Limitations fall precautions  -    Specific Treatment Considerations     Recorded by [JW] Yissel Tran PT    Pain Assessment    Pain Assessment 0-10  -    Pain Score 3  -JW    Post Pain Score 4  -JW    Pain Type Acute pain;Surgical pain  -JW    Pain Location Knee  -    Pain Orientation Right  -JW    Pain Intervention(s) Medication (See MAR);Repositioned;Ambulation/increased activity  -JW    Response to Interventions tolerated  -JW    Recorded by [JW] Yissel Tran PT    Cognitive Assessment/Intervention    Personal Safety Interventions     Recorded by      Bed Mobility, Assessment/Treatment    Bed Mob, Supine to Sit, [place] conditional independence  -JW    Bed Mob, Sit to Supine, [place] conditional independence  -JW    Bed Mobility, Comment     Recorded by [JW] Yissel Tran, PT    Transfer Assessment/Treatment    Transfers, Sit-Stand [place] conditional independence  -JW    Transfers, Stand-Sit [place] conditional independence  -JW    Transfers, Sit-Stand-Sit, Assist Device rolling walker  -JW    Recorded by [JW] Yissel Tran, PT    Gait Assessment/Treatment    Gait, [place] Level conditional independence  -JW    Gait, Assistive Device rolling walker  -JW    Gait, Distance (Feet) 300  -JW    Gait, Gait Pattern Analysis swing-through gait  -JW    Gait, Gait Deviations ligia decreased;forward flexed posture  -JW    Gait, Safety Issues     Gait, Impairments     Gait, Comment pt able to navigate external obstacles and demonstrates no balance loss during direction changes  -JW    Recorded by [JW] Yissel Tran, PT    Stairs Assessment/Treatment    Number of Stairs     Stairs, Handrail Location     Stairs, [place] Level     Stairs, Assistive Device     Stairs, Comment     Recorded by     Therapy Exercises    Right Lower Extremity --   pt has been performing exercises prior to PT arrival  -JW    RLE Resistance --   pt verbalizes no questions regarding written HEP  -JW    Recorded by [JW] Yissel Tran, PT    Positioning and Restraints    Pre-Treatment Position sitting in chair/recliner  -JW    Post Treatment Position chair  -JW    In Bed     In Chair reclined;notified nsg;call light within reach;encouraged to call for assist  -JW    Recorded by [JW] Yissel Tran, PT    Initials Name Effective Dates    DAPHNE Mcmahon, PTA 08/11/15 -     BP Nunu Dickinson, PT 12/01/15 -     AMADO Tran, PT 12/01/15 -           PT Recommendation and Plan  Anticipated Discharge Disposition: home with outpatient services  Planned Therapy  Interventions: balance training, bed mobility training, gait training, home exercise program, strengthening, transfer training, stair training  PT Frequency: 2 times/day  Plan of Care Review  Plan Of Care Reviewed With: patient            Outcome Measures       04/07/17 0840 04/06/17 1346 04/06/17 0953    How much help from another person do you currently need...    Turning from your back to your side while in flat bed without using bedrails? 4  -JW 3  -KM 3  -BP    Moving from lying on back to sitting on the side of a flat bed without bedrails? 4  -JW 3  -KM 3  -BP    Moving to and from a bed to a chair (including a wheelchair)? 4  -JW 3  -KM 3  -BP    Standing up from a chair using your arms (e.g., wheelchair, bedside chair)? 3  -JW 3  -KM 3  -BP    Climbing 3-5 steps with a railing? 3  -JW 3  -KM 3  -BP    To walk in hospital room? 4  -JW 3  -KM 3  -BP    AM-PAC 6 Clicks Score 22  -JW 18  -KM 18  -BP    Functional Assessment    Outcome Measure Options  AM-PAC 6 Clicks Basic Mobility (PT)  -KM AM-PAC 6 Clicks Basic Mobility (PT)  -BP      04/05/17 1308 04/05/17 0929 04/05/17 0928    How much help from another person do you currently need...    Turning from your back to your side while in flat bed without using bedrails? 3  -BP  3  -JW    Moving from lying on back to sitting on the side of a flat bed without bedrails? 3  -BP  3  -JW    Moving to and from a bed to a chair (including a wheelchair)? 3  -BP  3  -JW    Standing up from a chair using your arms (e.g., wheelchair, bedside chair)? 3  -BP  3  -JW    Climbing 3-5 steps with a railing? 3  -BP  3  -JW    To walk in hospital room? 3  -BP  3  -JW    AM-PAC 6 Clicks Score 18  -BP  18  -JW    How much help from another is currently needed...    Putting on and taking off regular lower body clothing?  3  -JJ     Bathing (including washing, rinsing, and drying)  3  -JJ     Toileting (which includes using toilet bed pan or urinal)  4  -JJ     Putting on and taking off  regular upper body clothing  4  -JJ     Taking care of personal grooming (such as brushing teeth)  4  -JJ     Eating meals  4  -JJ     Score  22  -JJ     Functional Assessment    Outcome Measure Options AM-PAC 6 Clicks Basic Mobility (PT)  -BP AM-PAC 6 Clicks Daily Activity (OT)  -J AM-PAC 6 Clicks Basic Mobility (PT)  -      User Key  (r) = Recorded By, (t) = Taken By, (c) = Cosigned By    Initials Name Provider Type    KM Ruth Mcmahon, PTA Physical Therapy Assistant    JBILLIE Soto, OTR Occupational Therapist    BP Nunu Dickinson, PT Physical Therapist    AMADO Tran, PT Physical Therapist           Time Calculation:         PT Charges       04/07/17 1036          Time Calculation    Start Time 0840  -      Stop Time 0855  -      Time Calculation (min) 15 min  -      PT Received On 04/07/17  -        User Key  (r) = Recorded By, (t) = Taken By, (c) = Cosigned By    Initials Name Provider Type    AMADO Tran, PT Physical Therapist          Therapy Charges for Today     Code Description Service Date Service Provider Modifiers Qty    60538140306 HC PT THER PROC EA 15 MIN 4/7/2017 Yissel Tran, PT GP 1          PT G-Codes  Outcome Measure Options: AM-PAC 6 Clicks Basic Mobility (PT)    PT Discharge Summary  Anticipated Discharge Disposition: home with outpatient services  Reason for Discharge: All goals achieved, Discharge from facility  Outcomes Achieved: Able to achieve all goals within established timeline  Discharge Destination: Home with outpatient services  Patient seen for physical therapy after total knee replacement.  Patient able to perform gait x300 feet with rolling walker, conditional independent.  Patient performs bed mobility with conditional independence.  Patient met all therapy goals at time of discharge and verbalizes no concerns regarding return home.  Patient to discharge home with outpatient physical therapy.  Yissel Tran, ELIEZER  4/7/2017

## 2017-04-17 ENCOUNTER — OFFICE VISIT (OUTPATIENT)
Dept: ORTHOPEDIC SURGERY | Facility: CLINIC | Age: 62
End: 2017-04-17

## 2017-04-17 VITALS — WEIGHT: 230 LBS | HEIGHT: 64 IN | BODY MASS INDEX: 39.27 KG/M2

## 2017-04-17 DIAGNOSIS — M17.11 PRIMARY OSTEOARTHRITIS OF RIGHT KNEE: Primary | ICD-10-CM

## 2017-04-17 DIAGNOSIS — Z09 STATUS POST ORTHOPEDIC SURGERY, FOLLOW-UP EXAM: ICD-10-CM

## 2017-04-17 PROCEDURE — 99024 POSTOP FOLLOW-UP VISIT: CPT | Performed by: ORTHOPAEDIC SURGERY

## 2017-04-17 RX ORDER — METHYLPREDNISOLONE 4 MG/1
TABLET ORAL
Qty: 21 TABLET | Refills: 0 | Status: SHIPPED | OUTPATIENT
Start: 2017-04-17 | End: 2022-03-18

## 2017-04-17 RX ORDER — MELOXICAM 7.5 MG/1
7.5 TABLET ORAL DAILY
Qty: 30 TABLET | Refills: 5 | Status: SHIPPED | OUTPATIENT
Start: 2017-04-17 | End: 2017-04-17 | Stop reason: SDUPTHER

## 2017-04-17 RX ORDER — ASPIRIN 325 MG
325 TABLET ORAL DAILY
Qty: 60 TABLET | Refills: 0 | Status: SHIPPED | OUTPATIENT
Start: 2017-04-17 | End: 2019-11-26 | Stop reason: DRUGHIGH

## 2017-04-17 RX ORDER — METHYLPREDNISOLONE 4 MG/1
TABLET ORAL
Qty: 21 TABLET | Refills: 0 | Status: SHIPPED | OUTPATIENT
Start: 2017-04-17 | End: 2017-04-17 | Stop reason: SDUPTHER

## 2017-04-17 RX ORDER — OXYCODONE AND ACETAMINOPHEN 7.5; 325 MG/1; MG/1
1 TABLET ORAL EVERY 6 HOURS PRN
Qty: 40 TABLET | Refills: 0 | Status: SHIPPED | OUTPATIENT
Start: 2017-04-17 | End: 2019-11-26

## 2017-04-17 RX ORDER — MELOXICAM 7.5 MG/1
7.5 TABLET ORAL DAILY
Qty: 30 TABLET | Refills: 5 | Status: SHIPPED | OUTPATIENT
Start: 2017-04-17 | End: 2022-03-18

## 2017-04-17 NOTE — PROGRESS NOTES
Subjective: Status post right total knee     Patient ID: Lianala Patel is a 61 y.o. female.    Chief Complaint:    History of Present Illness 61-year-old female is 2 weeks status post a right total knee the standpoint of the surgery and preoperative pain she is doing quite well with resolution of her discomfort.  She has developed what appears to be though a contact dermatitis from the Perineo Dermabond dressing there is no drainage or swelling to the knee.  With the wrist this redness again appears to be from the dressing itself.  She is ambulating freely with a cane.       Social History     Occupational History   • Not on file.     Social History Main Topics   • Smoking status: Never Smoker   • Smokeless tobacco: Never Used   • Alcohol use No   • Drug use: No   • Sexual activity: Defer      Review of Systems   Constitutional: Negative for chills, diaphoresis, fever and unexpected weight change.   HENT: Negative for hearing loss, nosebleeds, sore throat and tinnitus.    Eyes: Negative for pain and visual disturbance.   Respiratory: Negative for cough, shortness of breath and wheezing.    Cardiovascular: Negative for chest pain and palpitations.   Gastrointestinal: Negative for abdominal pain, diarrhea, nausea and vomiting.   Endocrine: Negative for cold intolerance, heat intolerance and polydipsia.   Genitourinary: Negative for difficulty urinating, dysuria and hematuria.   Musculoskeletal: Positive for arthralgias, joint swelling and myalgias.   Skin: Negative for rash and wound.   Allergic/Immunologic: Negative for environmental allergies.   Neurological: Negative for dizziness, syncope and numbness.   Hematological: Does not bruise/bleed easily.   Psychiatric/Behavioral: Negative for dysphoric mood and sleep disturbance. The patient is not nervous/anxious.          Past Medical History:   Diagnosis Date   • Arthritis    • Hypertension    • Kidney stones    • Left knee DJD     KNEE REPLACED   • OA  (osteoarthritis)    • Right knee pain     SCHEDULED FOR REPLACEMENT     Past Surgical History:   Procedure Laterality Date   • APPENDECTOMY     • CHOLECYSTECTOMY     • [place] TOTAL KNEE ARTHROPLASTY Left 9/13/2016    Procedure: TOTAL KNEE ARTHROPLASTY lc orthoalign;  Surgeon: Jerry Yarbrough MD;  Location:  [facility];  Service: Orthopedics   • [place] TOTAL KNEE ARTHROPLASTY Right 4/4/2017    Procedure: TOTAL KNEE ARTHROPLASTY, lc, orthoalign;  Surgeon: Jerry Yarbrough MD;  Location:  [facility];  Service: Orthopedics   • SINUS SURGERY     • TUBAL ABDOMINAL LIGATION       Family History   Problem Relation Age of Onset   • Hypertension Mother    • Hypertension Father    • Hypertension Sister    • Hypertension Brother          Objective:  There were no vitals filed for this visit.  Last 3 weights    04/17/17  0948   Weight: 230 lb (104 kg)     Body mass index is 39.48 kg/(m^2).       Ortho Exam  she is alert and oriented ×3.  She has no shortness of breath or other systemic symptoms but just the redness around the wound indicating the contact dermatitis.  She has 0-120° of motion with no instability throughout the arc of motion.  His calf is nontender.  No motor loss good distal pulses no sensory loss.    Assessment:       1. Primary osteoarthritis of right knee    2. Status post orthopedic surgery, follow-up exam          Plan:      I advised her to continue the Benadryl as needed for the itching.  We'll start a steroid pack followed by Mobic.  Refilled her pain medication.  Begin physical therapy at [place] tomorrow.  Return to see me in 2 weeks with an x-ray of the knee on return.      Work Status:    ANGEL query complete.    Orders:  No orders of the defined types were placed in this encounter.      Medications:  New Medications Ordered This Visit   Medications   • aspirin 325 MG tablet     Sig: Take 1 tablet by mouth Daily.     Dispense:  60 tablet     Refill:  0   • oxyCODONE-acetaminophen (PERCOCET) 7.5-325 MG  per tablet     Sig: Take 1 tablet by mouth Every 6 (Six) Hours As Needed for Severe Pain (7-10).     Dispense:  40 tablet     Refill:  0   • MethylPREDNISolone (MEDROL, BRAULIO,) 4 MG tablet     Sig: Use as directed by package instructions     Dispense:  21 tablet     Refill:  0   • meloxicam (MOBIC) 7.5 MG tablet     Sig: Take 1 tablet by mouth Daily.     Dispense:  30 tablet     Refill:  5       Followup:  Return in about 2 weeks (around 5/1/2017).          Dragon transcription disclaimer     Much of this encounter note is an electronic transcription/translation of spoken language to printed text. The electronic translation of spoken language may permit erroneous, or at times, nonsensical words or phrases to be inadvertently transcribed. Although I have reviewed the note for such errors, some may still exist.

## 2017-04-20 ENCOUNTER — APPOINTMENT (OUTPATIENT)
Dept: OTHER | Facility: HOSPITAL | Age: 62
End: 2017-04-20

## 2019-06-04 ENCOUNTER — APPOINTMENT (OUTPATIENT)
Dept: ONCOLOGY | Facility: CLINIC | Age: 64
End: 2019-06-04

## 2019-06-04 ENCOUNTER — APPOINTMENT (OUTPATIENT)
Dept: LAB | Facility: HOSPITAL | Age: 64
End: 2019-06-04

## 2019-07-03 ENCOUNTER — LAB (OUTPATIENT)
Dept: LAB | Facility: HOSPITAL | Age: 64
End: 2019-07-03

## 2019-07-03 ENCOUNTER — CONSULT (OUTPATIENT)
Dept: ONCOLOGY | Facility: CLINIC | Age: 64
End: 2019-07-03

## 2019-07-03 VITALS
RESPIRATION RATE: 14 BRPM | SYSTOLIC BLOOD PRESSURE: 124 MMHG | WEIGHT: 207.6 LBS | DIASTOLIC BLOOD PRESSURE: 68 MMHG | OXYGEN SATURATION: 96 % | TEMPERATURE: 98.2 F | HEART RATE: 66 BPM | BODY MASS INDEX: 35.44 KG/M2 | HEIGHT: 64 IN

## 2019-07-03 DIAGNOSIS — D72.820 LYMPHOCYTOSIS: Primary | ICD-10-CM

## 2019-07-03 DIAGNOSIS — D72.829 LEUKOCYTOSIS, UNSPECIFIED TYPE: Primary | ICD-10-CM

## 2019-07-03 LAB
ALBUMIN SERPL-MCNC: 4.1 G/DL (ref 3.5–5.2)
ALBUMIN/GLOB SERPL: 1.4 G/DL
ALP SERPL-CCNC: 81 U/L (ref 39–117)
ALT SERPL W P-5'-P-CCNC: 11 U/L (ref 1–33)
ANION GAP SERPL CALCULATED.3IONS-SCNC: 12.8 MMOL/L (ref 5–15)
AST SERPL-CCNC: 13 U/L (ref 1–32)
BASOPHILS # BLD AUTO: 0.14 10*3/MM3 (ref 0–0.2)
BASOPHILS NFR BLD AUTO: 0.5 % (ref 0–1.5)
BILIRUB SERPL-MCNC: 0.3 MG/DL (ref 0.2–1.2)
BUN BLD-MCNC: 13 MG/DL (ref 8–23)
BUN/CREAT SERPL: 18.3 (ref 7–25)
CALCIUM SPEC-SCNC: 9.2 MG/DL (ref 8.6–10.5)
CHLORIDE SERPL-SCNC: 99 MMOL/L (ref 98–107)
CO2 SERPL-SCNC: 31.2 MMOL/L (ref 22–29)
CREAT BLD-MCNC: 0.71 MG/DL (ref 0.57–1)
DEPRECATED RDW RBC AUTO: 41.5 FL (ref 37–54)
EOSINOPHIL # BLD AUTO: 0.32 10*3/MM3 (ref 0–0.4)
EOSINOPHIL NFR BLD AUTO: 1.2 % (ref 0.3–6.2)
ERYTHROCYTE [DISTWIDTH] IN BLOOD BY AUTOMATED COUNT: 13.3 % (ref 12.3–15.4)
GFR SERPL CREATININE-BSD FRML MDRD: 83 ML/MIN/1.73
GLOBULIN UR ELPH-MCNC: 3 GM/DL
GLUCOSE BLD-MCNC: 106 MG/DL (ref 65–99)
HCT VFR BLD AUTO: 41 % (ref 34–46.6)
HGB BLD-MCNC: 14.1 G/DL (ref 12–15.9)
IMM GRANULOCYTES # BLD AUTO: 0.13 10*3/MM3 (ref 0–0.05)
IMM GRANULOCYTES NFR BLD AUTO: 0.5 % (ref 0–0.5)
LDH SERPL-CCNC: 181 U/L (ref 135–214)
LYMPHOCYTES # BLD AUTO: 18.27 10*3/MM3 (ref 0.7–3.1)
LYMPHOCYTES NFR BLD AUTO: 65.7 % (ref 19.6–45.3)
MCH RBC QN AUTO: 29.6 PG (ref 26.6–33)
MCHC RBC AUTO-ENTMCNC: 34.4 G/DL (ref 31.5–35.7)
MCV RBC AUTO: 86.1 FL (ref 79–97)
MONOCYTES # BLD AUTO: 0.83 10*3/MM3 (ref 0.1–0.9)
MONOCYTES NFR BLD AUTO: 3 % (ref 5–12)
NEUTROPHILS # BLD AUTO: 8.11 10*3/MM3 (ref 1.7–7)
NEUTROPHILS NFR BLD AUTO: 29.1 % (ref 42.7–76)
NRBC BLD AUTO-RTO: 0 /100 WBC (ref 0–0.2)
PLATELET # BLD AUTO: 256 10*3/MM3 (ref 140–450)
PMV BLD AUTO: 9.6 FL (ref 6–12)
POTASSIUM BLD-SCNC: 3.2 MMOL/L (ref 3.5–5.2)
PROT SERPL-MCNC: 7.1 G/DL (ref 6–8.5)
RBC # BLD AUTO: 4.76 10*6/MM3 (ref 3.77–5.28)
SODIUM BLD-SCNC: 143 MMOL/L (ref 136–145)
WBC NRBC COR # BLD: 27.8 10*3/MM3 (ref 3.4–10.8)

## 2019-07-03 PROCEDURE — 85025 COMPLETE CBC W/AUTO DIFF WBC: CPT | Performed by: INTERNAL MEDICINE

## 2019-07-03 PROCEDURE — 80053 COMPREHEN METABOLIC PANEL: CPT | Performed by: INTERNAL MEDICINE

## 2019-07-03 PROCEDURE — 99245 OFF/OP CONSLTJ NEW/EST HI 55: CPT | Performed by: INTERNAL MEDICINE

## 2019-07-03 PROCEDURE — 36415 COLL VENOUS BLD VENIPUNCTURE: CPT | Performed by: INTERNAL MEDICINE

## 2019-07-03 PROCEDURE — 83615 LACTATE (LD) (LDH) ENZYME: CPT | Performed by: INTERNAL MEDICINE

## 2019-07-03 NOTE — PROGRESS NOTES
Subjective     REASON FOR CONSULTATION:  lymphocytosis  Provide an opinion on any further workup or treatment                             REQUESTING PRACTITIONER:  Addie Alfonso    RECORDS OBTAINED:  Records of the patients history including those obtained from the referring provider were reviewed and summarized in detail.      History of Present Illness   This is a very pleasant 64-year-old woman seen today for evaluation of leukocytosis.  As I review her labs, the patient has had fairly persistent leukocytosis since 2016.  She has had intermittent lymphocytosis present but her absolute lymphocyte count has significantly increased over the past 2 years.  Labs reviewed from her primary care provider on 4/8/2019 showed a white blood cell count 21.5, hemoglobin 14.5, platelet count 236 and a lymphocyte count of 13.5.  The patient denies unusual weight loss, night sweats, severe fatigue or lymphadenopathy.  She states that chronic leukemia has affected multiple members of her mother's family including her maternal grandmother and maternal great aunts.    Past Medical History:   Diagnosis Date   • Arthritis    • Hypertension    • Kidney stones    • Left knee DJD     KNEE REPLACED   • OA (osteoarthritis)    • Right knee pain     SCHEDULED FOR REPLACEMENT        Past Surgical History:   Procedure Laterality Date   • APPENDECTOMY     • CHOLECYSTECTOMY     • [place] TOTAL KNEE ARTHROPLASTY Left 9/13/2016    Procedure: TOTAL KNEE ARTHROPLASTY lc orthoalign;  Surgeon: Jerry Yarbrough MD;  Location: [facility];  Service: Orthopedics   • [place] TOTAL KNEE ARTHROPLASTY Right 4/4/2017    Procedure: TOTAL KNEE ARTHROPLASTY, lc, orthoalign;  Surgeon: Jerry Yarbrough MD;  Location: [facility];  Service: Orthopedics   • SINUS SURGERY     • TUBAL ABDOMINAL LIGATION          Current Outpatient Medications on File Prior to Visit   Medication Sig Dispense Refill   • aspirin 325 MG tablet Take 1 tablet by mouth Daily. 60 tablet 0   •  gabapentin (NEURONTIN) 300 MG capsule Take 1 capsule by mouth 3 (Three) Times a Day. 90 capsule 1   • meloxicam (MOBIC) 7.5 MG tablet Take 1 tablet by mouth Daily. 30 tablet 5   • MethylPREDNISolone (MEDROL, BRAULIO,) 4 MG tablet Use as directed by package instructions 21 tablet 0   • metoprolol tartrate (LOPRESSOR) 100 MG tablet Take 100 mg by mouth 2 (Two) Times a Day.     • Multiple Vitamins-Minerals (MULTIVITAMIN WITH MINERALS) tablet tablet Take 1 tablet by mouth Every Morning.     • oxyCODONE-acetaminophen (PERCOCET) 7.5-325 MG per tablet Take 1 tablet by mouth Every 6 (Six) Hours As Needed for Severe Pain (7-10). 40 tablet 0   • potassium chloride (K-DUR,KLOR-CON) 20 MEQ CR tablet Take 20 mEq by mouth 3 (Three) Times a Day.       No current facility-administered medications on file prior to visit.         ALLERGIES:    Allergies   Allergen Reactions   • Bee Venom Anaphylaxis   • Latex Hives     Has reaction if she has latex on her   • Penicillins Rash        Social History     Socioeconomic History   • Marital status: Single     Spouse name: Not on file   • Number of children: Not on file   • Years of education: Not on file   • Highest education level: Not on file   Occupational History     Employer: RETIRED   Tobacco Use   • Smoking status: Never Smoker   • Smokeless tobacco: Never Used   Substance and Sexual Activity   • Alcohol use: No   • Drug use: No   • Sexual activity: Defer        Family History   Problem Relation Age of Onset   • Hypertension Mother    • Hypertension Father    • Hypertension Sister    • Hypertension Brother         Review of Systems   Constitutional: Negative.    Eyes: Negative.    Respiratory: Negative.    Cardiovascular: Negative.    Gastrointestinal: Negative.    Genitourinary: Negative.    Musculoskeletal: Negative.    Skin: Negative.    Allergic/Immunologic: Negative.    Neurological: Positive for headaches.   Hematological: Negative.    Psychiatric/Behavioral: Negative.      "      Objective     Vitals:    07/03/19 1058   BP: 124/68   Pulse: 66   Resp: 14   Temp: 98.2 °F (36.8 °C)   TempSrc: Oral   SpO2: 96%   Weight: 94.2 kg (207 lb 9.6 oz)   Height: 163.5 cm (64.37\")   PainSc: 0-No pain     Current Status 7/3/2019   ECOG score 0       Physical Exam    CON: pleasant well-developed adult woman  HEENT: no icterus, no thrush, moist membranes  NECK: no jvd  LYMPH: no cervical or supraclavicular lad  CV: RRR, S1S2, no murmur  RESP: cta bilat, no wheezing, no rales  GI: soft, non-tender, no splenomegaly, +bs  MUSC: no edema, normal gait  NEURO: alert and oriented x3, normal strength  PSYCH: normal mood and affect  SKIN: no bruising or induration    RECENT LABS:  Hematology WBC   Date Value Ref Range Status   07/03/2019 27.80 (H) 3.40 - 10.80 10*3/mm3 Final     RBC   Date Value Ref Range Status   07/03/2019 4.76 3.77 - 5.28 10*6/mm3 Final     Hemoglobin   Date Value Ref Range Status   07/03/2019 14.1 12.0 - 15.9 g/dL Final     Hematocrit   Date Value Ref Range Status   07/03/2019 41.0 34.0 - 46.6 % Final     Platelets   Date Value Ref Range Status   07/03/2019 256 140 - 450 10*3/mm3 Final        Lab Results   Component Value Date    GLUCOSE 97 04/07/2017    BUN 15 04/07/2017    CREATININE 0.62 04/07/2017    EGFRIFNONA 98 04/07/2017    EGFRIFAFRI  09/14/2016      Comment:      <15 Indicative of kidney failure.    BCR 24.2 04/07/2017    K 4.1 04/07/2017    CO2 30.9 (H) 04/07/2017    CALCIUM 7.9 (L) 04/07/2017    ALBUMIN 4.00 09/12/2016     Chest x-ray 3/28/2017: Normal radiograph.    Assessment/Plan     This is a 64-year-old woman with lymphocytosis intermittently present for 2 years now progressive.  CBC findings are concerning for a lymphoproliferative disorder such as chronic lymphocytic leukemia which is statistically the most likely.  She does not have anemia, thrombocytopenia or any B symptoms such as weight loss, night sweats or severe fatigue.  She does not have palpable lymphadenopathy " or splenomegaly.    To further evaluate, I have requested CMP, LDH, peripheral blood flow cytometry and if a diagnosis of CLL is made we will check a CLL FISH profile.  I will see the patient back in 3 to 4 weeks to review results.  If the patient does have chronic lymphocytic leukemia, she does not appear to have any current indications for treatment.

## 2019-07-22 LAB
REF LAB TEST METHOD: NORMAL
REF LAB TEST METHOD: NORMAL

## 2019-07-31 ENCOUNTER — OFFICE VISIT (OUTPATIENT)
Dept: ONCOLOGY | Facility: CLINIC | Age: 64
End: 2019-07-31

## 2019-07-31 ENCOUNTER — LAB (OUTPATIENT)
Dept: LAB | Facility: HOSPITAL | Age: 64
End: 2019-07-31

## 2019-07-31 VITALS
SYSTOLIC BLOOD PRESSURE: 127 MMHG | DIASTOLIC BLOOD PRESSURE: 80 MMHG | HEART RATE: 63 BPM | OXYGEN SATURATION: 94 % | BODY MASS INDEX: 36.31 KG/M2 | HEIGHT: 64 IN | RESPIRATION RATE: 18 BRPM | WEIGHT: 212.7 LBS | TEMPERATURE: 98.8 F

## 2019-07-31 DIAGNOSIS — D72.820 LYMPHOCYTOSIS: ICD-10-CM

## 2019-07-31 DIAGNOSIS — C91.10 CHRONIC LYMPHOCYTIC LEUKEMIA (HCC): Primary | ICD-10-CM

## 2019-07-31 LAB
BASOPHILS # BLD AUTO: 0.08 10*3/MM3 (ref 0–0.2)
BASOPHILS NFR BLD AUTO: 0.3 % (ref 0–1.5)
DEPRECATED RDW RBC AUTO: 41.5 FL (ref 37–54)
EOSINOPHIL # BLD AUTO: 0.32 10*3/MM3 (ref 0–0.4)
EOSINOPHIL NFR BLD AUTO: 1.3 % (ref 0.3–6.2)
ERYTHROCYTE [DISTWIDTH] IN BLOOD BY AUTOMATED COUNT: 13.3 % (ref 12.3–15.4)
HCT VFR BLD AUTO: 39.9 % (ref 34–46.6)
HGB BLD-MCNC: 13.9 G/DL (ref 12–15.9)
IMM GRANULOCYTES # BLD AUTO: 0.14 10*3/MM3 (ref 0–0.05)
IMM GRANULOCYTES NFR BLD AUTO: 0.6 % (ref 0–0.5)
LYMPHOCYTES # BLD AUTO: 16.35 10*3/MM3 (ref 0.7–3.1)
LYMPHOCYTES NFR BLD AUTO: 66.5 % (ref 19.6–45.3)
MCH RBC QN AUTO: 30 PG (ref 26.6–33)
MCHC RBC AUTO-ENTMCNC: 34.8 G/DL (ref 31.5–35.7)
MCV RBC AUTO: 86 FL (ref 79–97)
MONOCYTES # BLD AUTO: 0.75 10*3/MM3 (ref 0.1–0.9)
MONOCYTES NFR BLD AUTO: 3.1 % (ref 5–12)
NEUTROPHILS # BLD AUTO: 6.93 10*3/MM3 (ref 1.7–7)
NEUTROPHILS NFR BLD AUTO: 28.2 % (ref 42.7–76)
NRBC BLD AUTO-RTO: 0.2 /100 WBC (ref 0–0.2)
PLATELET # BLD AUTO: 217 10*3/MM3 (ref 140–450)
PMV BLD AUTO: 9.6 FL (ref 6–12)
RBC # BLD AUTO: 4.64 10*6/MM3 (ref 3.77–5.28)
WBC NRBC COR # BLD: 24.57 10*3/MM3 (ref 3.4–10.8)

## 2019-07-31 PROCEDURE — 85025 COMPLETE CBC W/AUTO DIFF WBC: CPT | Performed by: INTERNAL MEDICINE

## 2019-07-31 PROCEDURE — 99214 OFFICE O/P EST MOD 30 MIN: CPT | Performed by: INTERNAL MEDICINE

## 2019-07-31 PROCEDURE — 36415 COLL VENOUS BLD VENIPUNCTURE: CPT | Performed by: INTERNAL MEDICINE

## 2019-07-31 RX ORDER — AMLODIPINE BESYLATE 5 MG/1
5 TABLET ORAL 2 TIMES DAILY
Refills: 1 | COMMUNITY
Start: 2019-07-05

## 2019-07-31 RX ORDER — PRAVASTATIN SODIUM 20 MG
20 TABLET ORAL
Refills: 1 | COMMUNITY
Start: 2019-07-05

## 2019-07-31 RX ORDER — LISINOPRIL AND HYDROCHLOROTHIAZIDE 25; 20 MG/1; MG/1
1 TABLET ORAL 2 TIMES DAILY
Refills: 1 | COMMUNITY
Start: 2019-07-05 | End: 2022-03-18

## 2019-07-31 NOTE — PROGRESS NOTES
Subjective     REASON FOR FOLLOW-UP: Chronic lymphocytic leukemia                               REQUESTING PRACTITIONER:  Addie Alfonso    RECORDS OBTAINED:  Records of the patients history including those obtained from the referring provider were reviewed and summarized in detail.      History of Present Illness   This is a very pleasant 64-year-old woman seen today for evaluation of leukocytosis.  As I review her labs, the patient has had fairly persistent leukocytosis since 2016.  She has had intermittent lymphocytosis present but her absolute lymphocyte count has significantly increased over the past 2 years.  Labs reviewed from her primary care provider on 4/8/2019 showed a white blood cell count 21.5, hemoglobin 14.5, platelet count 236 and a lymphocyte count of 13.5.  The patient denies unusual weight loss, night sweats, severe fatigue or lymphadenopathy.  She states that chronic leukemia has affected multiple members of her mother's family including her maternal grandmother and maternal great aunts.    The patient was seen on 7/3/2019 and peripheral blood flow cytometry performed showing a CD5, CD3 and CD20 positive monoclonal B-cell population in the small cell region consistent with chronic lymphocytic leukemia.  A CLL FISH panel was negative for trisomy 12, 13 q. deletion, JOCY, and p53.    She returns today doing well with no weight loss, night sweats or progressive lymphadenopathy.    Past Medical History:   Diagnosis Date   • Arthritis    • Chronic headaches    • Elevated WBC count    • Hypertension    • Kidney stones    • Left knee DJD     KNEE REPLACED   • OA (osteoarthritis)    • Right knee pain     SCHEDULED FOR REPLACEMENT   • Tattoos         Past Surgical History:   Procedure Laterality Date   • APPENDECTOMY     • CHOLECYSTECTOMY  1990   • [place] TOTAL KNEE ARTHROPLASTY Left 9/13/2016    Procedure: TOTAL KNEE ARTHROPLASTY lc orthoalign;  Surgeon: Jerry Yarbrough MD;  Location: [facility];  Service:  Orthopedics   • [place] TOTAL KNEE ARTHROPLASTY Right 4/4/2017    Procedure: TOTAL KNEE ARTHROPLASTY, lc, orthoalign;  Surgeon: Jerry Yarbrough MD;  Location: [facility];  Service: Orthopedics   • SINUS SURGERY     • TUBAL ABDOMINAL LIGATION  1976        Current Outpatient Medications on File Prior to Visit   Medication Sig Dispense Refill   • amLODIPine (NORVASC) 5 MG tablet Take 5 mg by mouth 2 (Two) Times a Day.  1   • aspirin 81 MG tablet Take 81 mg by mouth Daily.     • lisinopril-hydrochlorothiazide (PRINZIDE,ZESTORETIC) 20-25 MG per tablet Take 1 tablet by mouth 2 (Two) Times a Day.  1   • metoprolol tartrate (LOPRESSOR) 100 MG tablet Take 100 mg by mouth 2 (Two) Times a Day.     • Multiple Vitamins-Minerals (MULTIVITAMIN WITH MINERALS) tablet tablet Take 1 tablet by mouth Every Morning.     • pravastatin (PRAVACHOL) 20 MG tablet Take 20 mg by mouth every night at bedtime.  1   • aspirin 325 MG tablet Take 1 tablet by mouth Daily. 60 tablet 0   • gabapentin (NEURONTIN) 300 MG capsule Take 1 capsule by mouth 3 (Three) Times a Day. 90 capsule 1   • meloxicam (MOBIC) 7.5 MG tablet Take 1 tablet by mouth Daily. 30 tablet 5   • MethylPREDNISolone (MEDROL, BRAULIO,) 4 MG tablet Use as directed by package instructions 21 tablet 0   • oxyCODONE-acetaminophen (PERCOCET) 7.5-325 MG per tablet Take 1 tablet by mouth Every 6 (Six) Hours As Needed for Severe Pain (7-10). 40 tablet 0   • potassium chloride (K-DUR,KLOR-CON) 20 MEQ CR tablet Take 20 mEq by mouth 3 (Three) Times a Day.       No current facility-administered medications on file prior to visit.         ALLERGIES:    Allergies   Allergen Reactions   • Bee Venom Anaphylaxis   • Latex Hives     Has reaction if she has latex on her   • Penicillins Rash   • Tetracyclines & Related Rash        Social History     Socioeconomic History   • Marital status: Single     Spouse name: Not on file   • Number of children: 1   • Years of education: High School   • Highest education  "level: Not on file   Occupational History   • Occupation: [employer]     Employer: RETIRED   Tobacco Use   • Smoking status: Never Smoker   • Smokeless tobacco: Never Used   Substance and Sexual Activity   • Alcohol use: No   • Drug use: No   • Sexual activity: Defer        Family History   Problem Relation Age of Onset   • Hypertension Mother    • Hypertension Father    • Kidney cancer Father 60   • Hypertension Sister    • Diabetes type II Sister    • Diabetes Sister    • Hypertension Brother    • Lung cancer Brother 57        Review of Systems   Constitutional: Negative.    Eyes: Negative.    Respiratory: Negative.    Cardiovascular: Negative.    Gastrointestinal: Negative.    Genitourinary: Negative.    Musculoskeletal: Positive for arthralgias.   Skin: Negative.    Allergic/Immunologic: Negative.    Neurological: Positive for headaches.   Hematological: Negative.    Psychiatric/Behavioral: Negative.           Objective     Vitals:    07/31/19 1028   BP: 127/80   Pulse: 63   Resp: 18   Temp: 98.8 °F (37.1 °C)   TempSrc: Oral   SpO2: 94%   Weight: 96.5 kg (212 lb 11.2 oz)   Height: 163.5 cm (64.37\")   PainSc: 0-No pain     Current Status 7/31/2019   ECOG score 0       Physical Exam    CON: pleasant well-developed adult woman  HEENT: no icterus, no thrush, moist membranes  NECK: no jvd  LYMPH: no cervical or supraclavicular lad  CV: RRR, S1S2, no murmur  RESP: cta bilat, no wheezing, no rales  GI: soft, non-tender, no splenomegaly, +bs  MUSC: no edema, normal gait  NEURO: alert and oriented x3, normal strength  PSYCH: normal mood and affect  SKIN: no bruising or induration  Exam unchanged-7/31/2019    RECENT LABS:  Hematology WBC   Date Value Ref Range Status   07/31/2019 24.57 (H) 3.40 - 10.80 10*3/mm3 Final     RBC   Date Value Ref Range Status   07/31/2019 4.64 3.77 - 5.28 10*6/mm3 Final     Hemoglobin   Date Value Ref Range Status   07/31/2019 13.9 12.0 - 15.9 g/dL Final     Hematocrit "   Date Value Ref Range Status   07/31/2019 39.9 34.0 - 46.6 % Final     Platelets   Date Value Ref Range Status   07/31/2019 217 140 - 450 10*3/mm3 Final        Lab Results   Component Value Date    GLUCOSE 106 (H) 07/03/2019    BUN 13 07/03/2019    CREATININE 0.71 07/03/2019    EGFRIFNONA 83 07/03/2019    EGFRIFAFRI  09/14/2016      Comment:      <15 Indicative of kidney failure.    BCR 18.3 07/03/2019    K 3.2 (L) 07/03/2019    CO2 31.2 (H) 07/03/2019    CALCIUM 9.2 07/03/2019    ALBUMIN 4.10 07/03/2019    AST 13 07/03/2019    ALT 11 07/03/2019     Chest x-ray 3/28/2017: Normal radiograph.    Assessment/Plan     This is a pleasant 64-year-old lady with lymphocytosis present since 2016.  Flow cytometry confirms chronic lymphocytic leukemia and the CLL FISH panel is negative.  She currently has no symptoms or side effects of chronic lymphocytic leukemia that requires treatment.  I reviewed the natural progression and indications for treatment of this disease with the patient.  I will see her back in 4 months with a CBC and status check.

## 2019-11-26 ENCOUNTER — OFFICE VISIT (OUTPATIENT)
Dept: ONCOLOGY | Facility: CLINIC | Age: 64
End: 2019-11-26

## 2019-11-26 ENCOUNTER — LAB (OUTPATIENT)
Dept: LAB | Facility: HOSPITAL | Age: 64
End: 2019-11-26

## 2019-11-26 VITALS
HEART RATE: 66 BPM | SYSTOLIC BLOOD PRESSURE: 129 MMHG | BODY MASS INDEX: 37.4 KG/M2 | OXYGEN SATURATION: 93 % | RESPIRATION RATE: 14 BRPM | HEIGHT: 64 IN | DIASTOLIC BLOOD PRESSURE: 84 MMHG | WEIGHT: 219.1 LBS | TEMPERATURE: 97.9 F

## 2019-11-26 DIAGNOSIS — C91.10 CHRONIC LYMPHOCYTIC LEUKEMIA (HCC): ICD-10-CM

## 2019-11-26 DIAGNOSIS — C91.10 CHRONIC LYMPHOCYTIC LEUKEMIA (HCC): Primary | ICD-10-CM

## 2019-11-26 LAB
BASOPHILS # BLD AUTO: 0.13 10*3/MM3 (ref 0–0.2)
BASOPHILS NFR BLD AUTO: 0.5 % (ref 0–1.5)
DEPRECATED RDW RBC AUTO: 40.8 FL (ref 37–54)
EOSINOPHIL # BLD AUTO: 0.27 10*3/MM3 (ref 0–0.4)
EOSINOPHIL NFR BLD AUTO: 0.9 % (ref 0.3–6.2)
ERYTHROCYTE [DISTWIDTH] IN BLOOD BY AUTOMATED COUNT: 13.1 % (ref 12.3–15.4)
HCT VFR BLD AUTO: 40.7 % (ref 34–46.6)
HGB BLD-MCNC: 14.2 G/DL (ref 12–15.9)
IMM GRANULOCYTES # BLD AUTO: 0.13 10*3/MM3 (ref 0–0.05)
IMM GRANULOCYTES NFR BLD AUTO: 0.5 % (ref 0–0.5)
LYMPHOCYTES # BLD AUTO: 21.19 10*3/MM3 (ref 0.7–3.1)
LYMPHOCYTES NFR BLD AUTO: 73.7 % (ref 19.6–45.3)
MCH RBC QN AUTO: 29.9 PG (ref 26.6–33)
MCHC RBC AUTO-ENTMCNC: 34.9 G/DL (ref 31.5–35.7)
MCV RBC AUTO: 85.7 FL (ref 79–97)
MONOCYTES # BLD AUTO: 0.79 10*3/MM3 (ref 0.1–0.9)
MONOCYTES NFR BLD AUTO: 2.7 % (ref 5–12)
NEUTROPHILS # BLD AUTO: 6.25 10*3/MM3 (ref 1.7–7)
NEUTROPHILS NFR BLD AUTO: 21.7 % (ref 42.7–76)
NRBC BLD AUTO-RTO: 0 /100 WBC (ref 0–0.2)
PLATELET # BLD AUTO: 229 10*3/MM3 (ref 140–450)
PMV BLD AUTO: 9.7 FL (ref 6–12)
RBC # BLD AUTO: 4.75 10*6/MM3 (ref 3.77–5.28)
WBC NRBC COR # BLD: 28.76 10*3/MM3 (ref 3.4–10.8)

## 2019-11-26 PROCEDURE — 36415 COLL VENOUS BLD VENIPUNCTURE: CPT

## 2019-11-26 PROCEDURE — 85025 COMPLETE CBC W/AUTO DIFF WBC: CPT

## 2019-11-26 PROCEDURE — 99213 OFFICE O/P EST LOW 20 MIN: CPT | Performed by: INTERNAL MEDICINE

## 2019-11-26 NOTE — PROGRESS NOTES
Subjective     REASON FOR FOLLOW-UP: Chronic lymphocytic leukemia                               REQUESTING PRACTITIONER:  Addie Alfonso    RECORDS OBTAINED:  Records of the patients history including those obtained from the referring provider were reviewed and summarized in detail.      History of Present Illness   This is a very pleasant 64-year-old woman seen today for evaluation of leukocytosis.  As I review her labs, the patient has had fairly persistent leukocytosis since 2016.  She has had intermittent lymphocytosis present but her absolute lymphocyte count has significantly increased over the past 2 years.  Labs reviewed from her primary care provider on 4/8/2019 showed a white blood cell count 21.5, hemoglobin 14.5, platelet count 236 and a lymphocyte count of 13.5.  The patient denies unusual weight loss, night sweats, severe fatigue or lymphadenopathy.  She states that chronic leukemia has affected multiple members of her mother's family including her maternal grandmother and maternal great aunts.    The patient was seen on 7/3/2019 and peripheral blood flow cytometry performed showing a CD5, CD3 and CD20 positive monoclonal B-cell population in the small cell region consistent with chronic lymphocytic leukemia.  A CLL FISH panel was negative for trisomy 12, 13 q. deletion, JOCY, and p53.    She return today doing well other than a sinus infection for which she is on antibiotic from her primary care provider.  She denies unusual weight loss, night sweats or lymphadenopathy.    Past Medical History:   Diagnosis Date   • Arthritis    • Chronic headaches    • Elevated WBC count    • Hypertension    • Kidney stones    • Left knee DJD     KNEE REPLACED   • OA (osteoarthritis)    • Right knee pain     SCHEDULED FOR REPLACEMENT   • Tattoos         Past Surgical History:   Procedure Laterality Date   • APPENDECTOMY     • CHOLECYSTECTOMY  1990   • NM TOTAL KNEE ARTHROPLASTY Left 9/13/2016    Procedure: TOTAL KNEE  ARTHROPLASTY lc orthoalign;  Surgeon: Jerry Yarbrough MD;  Location: [facility];  Service: Orthopedics   • [place] TOTAL KNEE ARTHROPLASTY Right 4/4/2017    Procedure: TOTAL KNEE ARTHROPLASTY, lc, orthoalign;  Surgeon: Jerry Yarbrough MD;  Location: [facility] OR;  Service: Orthopedics   • SINUS SURGERY     • TUBAL ABDOMINAL LIGATION  1976        Current Outpatient Medications on File Prior to Visit   Medication Sig Dispense Refill   • amLODIPine (NORVASC) 5 MG tablet Take 5 mg by mouth 2 (Two) Times a Day.  1   • aspirin 81 MG tablet Take 81 mg by mouth Daily.     • lisinopril-hydrochlorothiazide (PRINZIDE,ZESTORETIC) 20-25 MG per tablet Take 1 tablet by mouth 2 (Two) Times a Day.  1   • meloxicam (MOBIC) 7.5 MG tablet Take 1 tablet by mouth Daily. 30 tablet 5   • MethylPREDNISolone (MEDROL, BRAULIO,) 4 MG tablet Use as directed by package instructions 21 tablet 0   • metoprolol tartrate (LOPRESSOR) 100 MG tablet Take 100 mg by mouth 2 (Two) Times a Day.     • Multiple Vitamins-Minerals (MULTIVITAMIN WITH MINERALS) tablet tablet Take 1 tablet by mouth Every Morning.     • potassium chloride (K-DUR,KLOR-CON) 20 MEQ CR tablet Take 20 mEq by mouth 3 (Three) Times a Day.     • pravastatin (PRAVACHOL) 20 MG tablet Take 20 mg by mouth every night at bedtime.  1   • [DISCONTINUED] aspirin 325 MG tablet Take 1 tablet by mouth Daily. 60 tablet 0   • [DISCONTINUED] gabapentin (NEURONTIN) 300 MG capsule Take 1 capsule by mouth 3 (Three) Times a Day. 90 capsule 1   • [DISCONTINUED] oxyCODONE-acetaminophen (PERCOCET) 7.5-325 MG per tablet Take 1 tablet by mouth Every 6 (Six) Hours As Needed for Severe Pain (7-10). 40 tablet 0     No current facility-administered medications on file prior to visit.         ALLERGIES:    Allergies   Allergen Reactions   • Bee Venom Anaphylaxis   • Latex Hives     Has reaction if she has latex on her   • Penicillins Rash   • Tetracyclines & Related Rash        Social History     Socioeconomic History   •  "Marital status: Single     Spouse name: Not on file   • Number of children: 1   • Years of education: High School   • Highest education level: Not on file   Occupational History   • Occupation: [employer]     Employer: RETIRED   Tobacco Use   • Smoking status: Never Smoker   • Smokeless tobacco: Never Used   Substance and Sexual Activity   • Alcohol use: No   • Drug use: No   • Sexual activity: Defer        Family History   Problem Relation Age of Onset   • Hypertension Mother    • Hypertension Father    • Kidney cancer Father 60   • Hypertension Sister    • Diabetes type II Sister    • Diabetes Sister    • Hypertension Brother    • Lung cancer Brother 57        Review of Systems   Constitutional: Negative.    HENT: Positive for congestion and sinus pressure.    Eyes: Negative.    Respiratory: Negative.    Cardiovascular: Negative.    Gastrointestinal: Negative.    Genitourinary: Negative.    Musculoskeletal: Positive for arthralgias.   Skin: Negative.    Allergic/Immunologic: Negative.    Neurological: Positive for headaches.   Hematological: Negative.    Psychiatric/Behavioral: Negative.           Objective     Vitals:    11/26/19 1018   BP: 129/84   Pulse: 66   Resp: 14   Temp: 97.9 °F (36.6 °C)   TempSrc: Oral   SpO2: 93%   Weight: 99.4 kg (219 lb 1.6 oz)   Height: 163.5 cm (64.37\")   PainSc: 0-No pain     Current Status 7/31/2019   ECOG score 0       Physical Exam    CON: pleasant well-developed adult woman  HEENT: no icterus, no thrush, moist membranes  NECK: no jvd  LYMPH: no cervical or supraclavicular lad  CV: RRR, S1S2, no murmur  RESP: cta bilat, no wheezing, no rales  GI: soft, non-tender, no splenomegaly, +bs  MUSC: no edema, normal gait  NEURO: alert and oriented x3, normal strength  PSYCH: normal mood and affect  SKIN: no bruising or induration  Exam unchanged- 11/26/2019    RECENT LABS:  Hematology WBC   Date Value Ref Range Status   11/26/2019 28.76 (H) 3.40 - 10.80 10*3/mm3 " Final     RBC   Date Value Ref Range Status   11/26/2019 4.75 3.77 - 5.28 10*6/mm3 Final     Hemoglobin   Date Value Ref Range Status   11/26/2019 14.2 12.0 - 15.9 g/dL Final     Hematocrit   Date Value Ref Range Status   11/26/2019 40.7 34.0 - 46.6 % Final     Platelets   Date Value Ref Range Status   11/26/2019 229 140 - 450 10*3/mm3 Final        Lab Results   Component Value Date    GLUCOSE 106 (H) 07/03/2019    BUN 13 07/03/2019    CREATININE 0.71 07/03/2019    EGFRIFNONA 83 07/03/2019    EGFRIFAFRI  09/14/2016      Comment:      <15 Indicative of kidney failure.    BCR 18.3 07/03/2019    K 3.2 (L) 07/03/2019    CO2 31.2 (H) 07/03/2019    CALCIUM 9.2 07/03/2019    ALBUMIN 4.10 07/03/2019    AST 13 07/03/2019    ALT 11 07/03/2019     Chest x-ray 3/28/2017: Normal radiograph.    Assessment/Plan     This is a pleasant 64-year-old lady with lymphocytosis present since 2016.  Flow cytometry 7/3/2019 confirmed chronic lymphocytic leukemia and the CLL FISH panel is negative.  She currently has no symptoms or side effects of chronic lymphocytic leukemia that requires treatment.  I reviewed the natural progression and indications for treatment of this disease with the patient.  I will see her back in 6 months with a CBC and status check.

## 2020-05-11 ENCOUNTER — TELEPHONE (OUTPATIENT)
Dept: ONCOLOGY | Facility: CLINIC | Age: 65
End: 2020-05-11

## 2020-05-12 ENCOUNTER — LAB (OUTPATIENT)
Dept: LAB | Facility: HOSPITAL | Age: 65
End: 2020-05-12

## 2020-05-12 ENCOUNTER — OFFICE VISIT (OUTPATIENT)
Dept: ONCOLOGY | Facility: CLINIC | Age: 65
End: 2020-05-12

## 2020-05-12 VITALS
HEART RATE: 82 BPM | TEMPERATURE: 98.5 F | HEIGHT: 64 IN | BODY MASS INDEX: 37.32 KG/M2 | RESPIRATION RATE: 18 BRPM | OXYGEN SATURATION: 94 % | DIASTOLIC BLOOD PRESSURE: 74 MMHG | SYSTOLIC BLOOD PRESSURE: 111 MMHG | WEIGHT: 218.6 LBS

## 2020-05-12 DIAGNOSIS — C91.10 CHRONIC LYMPHOCYTIC LEUKEMIA (HCC): ICD-10-CM

## 2020-05-12 DIAGNOSIS — C91.10 CHRONIC LYMPHOCYTIC LEUKEMIA (HCC): Primary | ICD-10-CM

## 2020-05-12 LAB
BASOPHILS # BLD AUTO: 0.2 10*3/MM3 (ref 0–0.2)
BASOPHILS NFR BLD AUTO: 0.6 % (ref 0–1.5)
DEPRECATED RDW RBC AUTO: 42.1 FL (ref 37–54)
EOSINOPHIL # BLD AUTO: 0.52 10*3/MM3 (ref 0–0.4)
EOSINOPHIL NFR BLD AUTO: 1.5 % (ref 0.3–6.2)
ERYTHROCYTE [DISTWIDTH] IN BLOOD BY AUTOMATED COUNT: 13.3 % (ref 12.3–15.4)
HCT VFR BLD AUTO: 42.4 % (ref 34–46.6)
HGB BLD-MCNC: 14.4 G/DL (ref 12–15.9)
IMM GRANULOCYTES # BLD AUTO: 0.19 10*3/MM3 (ref 0–0.05)
IMM GRANULOCYTES NFR BLD AUTO: 0.5 % (ref 0–0.5)
LYMPHOCYTES # BLD AUTO: 25.2 10*3/MM3 (ref 0.7–3.1)
LYMPHOCYTES NFR BLD AUTO: 72.7 % (ref 19.6–45.3)
MCH RBC QN AUTO: 29.8 PG (ref 26.6–33)
MCHC RBC AUTO-ENTMCNC: 34 G/DL (ref 31.5–35.7)
MCV RBC AUTO: 87.8 FL (ref 79–97)
MONOCYTES # BLD AUTO: 1.08 10*3/MM3 (ref 0.1–0.9)
MONOCYTES NFR BLD AUTO: 3.1 % (ref 5–12)
NEUTROPHILS # BLD AUTO: 7.45 10*3/MM3 (ref 1.7–7)
NEUTROPHILS NFR BLD AUTO: 21.6 % (ref 42.7–76)
NRBC BLD AUTO-RTO: 0 /100 WBC (ref 0–0.2)
PLATELET # BLD AUTO: 271 10*3/MM3 (ref 140–450)
PMV BLD AUTO: 9.4 FL (ref 6–12)
RBC # BLD AUTO: 4.83 10*6/MM3 (ref 3.77–5.28)
WBC NRBC COR # BLD: 34.64 10*3/MM3 (ref 3.4–10.8)

## 2020-05-12 PROCEDURE — 36415 COLL VENOUS BLD VENIPUNCTURE: CPT

## 2020-05-12 PROCEDURE — 85025 COMPLETE CBC W/AUTO DIFF WBC: CPT

## 2020-05-12 PROCEDURE — 99213 OFFICE O/P EST LOW 20 MIN: CPT | Performed by: INTERNAL MEDICINE

## 2020-05-12 NOTE — PROGRESS NOTES
Subjective     REASON FOR FOLLOW-UP: Chronic lymphocytic leukemia                               REQUESTING PRACTITIONER:  Addie Alfonso    RECORDS OBTAINED:  Records of the patients history including those obtained from the referring provider were reviewed and summarized in detail.      History of Present Illness   This is a very pleasant 64-year-old woman seen today for evaluation of leukocytosis.  As I review her labs, the patient has had fairly persistent leukocytosis since 2016.  She has had intermittent lymphocytosis present but her absolute lymphocyte count has significantly increased over the past 2 years.  Labs reviewed from her primary care provider on 4/8/2019 showed a white blood cell count 21.5, hemoglobin 14.5, platelet count 236 and a lymphocyte count of 13.5.  The patient denies unusual weight loss, night sweats, severe fatigue or lymphadenopathy.  She states that chronic leukemia has affected multiple members of her mother's family including her maternal grandmother and maternal great aunts.    The patient was seen on 7/3/2019 and peripheral blood flow cytometry performed showing a CD5, CD3 and CD20 positive monoclonal B-cell population in the small cell region consistent with chronic lymphocytic leukemia.  A CLL FISH panel was negative for trisomy 12, 13 q. deletion, JOCY, and p53.    The patient returned today for follow-up.  She is doing well without unusual weight loss, night sweats or lymphadenopathy.  Her mother passed away age 86 of natural causes during her sleep.    Past Medical History:   Diagnosis Date   • Arthritis    • Chronic headaches    • Elevated WBC count    • Hypertension    • Kidney stones    • Left knee DJD     KNEE REPLACED   • OA (osteoarthritis)    • Right knee pain     SCHEDULED FOR REPLACEMENT   • Tattoos         Past Surgical History:   Procedure Laterality Date   • APPENDECTOMY     • CHOLECYSTECTOMY  1990   • [place] TOTAL KNEE ARTHROPLASTY Left 9/13/2016    Procedure: TOTAL  KNEE ARTHROPLASTY lc orthoalign;  Surgeon: Jerry Yarbrough MD;  Location: [facility] OR;  Service: Orthopedics   • [place] TOTAL KNEE ARTHROPLASTY Right 4/4/2017    Procedure: TOTAL KNEE ARTHROPLASTY, lc, orthoalign;  Surgeon: eJrry Yarbrough MD;  Location: [facility] OR;  Service: Orthopedics   • SINUS SURGERY     • TUBAL ABDOMINAL LIGATION  1976        Current Outpatient Medications on File Prior to Visit   Medication Sig Dispense Refill   • amLODIPine (NORVASC) 5 MG tablet Take 5 mg by mouth 2 (Two) Times a Day.  1   • aspirin 81 MG tablet Take 81 mg by mouth Daily.     • lisinopril-hydrochlorothiazide (PRINZIDE,ZESTORETIC) 20-25 MG per tablet Take 1 tablet by mouth 2 (Two) Times a Day.  1   • metoprolol tartrate (LOPRESSOR) 100 MG tablet Take 100 mg by mouth 2 (Two) Times a Day.     • Multiple Vitamins-Minerals (MULTIVITAMIN WITH MINERALS) tablet tablet Take 1 tablet by mouth Every Morning.     • potassium chloride (K-DUR,KLOR-CON) 20 MEQ CR tablet Take 20 mEq by mouth 3 (Three) Times a Day.     • pravastatin (PRAVACHOL) 20 MG tablet Take 20 mg by mouth every night at bedtime.  1   • meloxicam (MOBIC) 7.5 MG tablet Take 1 tablet by mouth Daily. 30 tablet 5   • MethylPREDNISolone (MEDROL, BRAULIO,) 4 MG tablet Use as directed by package instructions 21 tablet 0     No current facility-administered medications on file prior to visit.         ALLERGIES:    Allergies   Allergen Reactions   • Bee Venom Anaphylaxis   • Latex Hives     Has reaction if she has latex on her   • Penicillins Rash   • Tetracyclines & Related Rash        Social History     Socioeconomic History   • Marital status: Single     Spouse name: Not on file   • Number of children: 1   • Years of education: High School   • Highest education level: Not on file   Occupational History   • Occupation: [employer]     Employer: RETIRED   Tobacco Use   • Smoking status: Never Smoker   • Smokeless tobacco: Never Used   Substance and Sexual  "Activity   • Alcohol use: No   • Drug use: No   • Sexual activity: Defer        Family History   Problem Relation Age of Onset   • Hypertension Mother    • Hypertension Father    • Kidney cancer Father 60   • Hypertension Sister    • Diabetes type II Sister    • Diabetes Sister    • Hypertension Brother    • Lung cancer Brother 57        Review of Systems   Constitutional: Negative.    HENT: Negative for congestion and sinus pressure.    Eyes: Negative.    Respiratory: Negative.    Cardiovascular: Negative.    Gastrointestinal: Negative.    Genitourinary: Negative.    Musculoskeletal: Positive for arthralgias.   Skin: Negative.    Allergic/Immunologic: Negative.    Neurological: Positive for headaches.   Hematological: Negative.    Psychiatric/Behavioral: Negative.           Objective     Vitals:    05/12/20 0858   BP: 111/74   Pulse: 82   Resp: 18   Temp: 98.5 °F (36.9 °C)   TempSrc: Oral   SpO2: 94%   Weight: 99.2 kg (218 lb 9.6 oz)   Height: 163.5 cm (64.37\")   PainSc: 0-No pain     Current Status 5/12/2020   ECOG score 0       Physical Exam    CON: pleasant well-developed adult woman  HEENT: no icterus, no thrush, moist membranes  NECK: no jvd  LYMPH: no cervical or supraclavicular lad  CV: RRR, S1S2, no murmur  RESP: cta bilat, no wheezing, no rales  GI: soft, non-tender, no splenomegaly, +bs  MUSC: no edema, normal gait  NEURO: alert and oriented x3, normal strength  PSYCH: normal mood and affect  SKIN: no bruising or induration  Exam unchanged- 5/12/2020    RECENT LABS:  Hematology WBC   Date Value Ref Range Status   05/12/2020 34.64 (C) 3.40 - 10.80 10*3/mm3 Final     RBC   Date Value Ref Range Status   05/12/2020 4.83 3.77 - 5.28 10*6/mm3 Final     Hemoglobin   Date Value Ref Range Status   05/12/2020 14.4 12.0 - 15.9 g/dL Final     Hematocrit   Date Value Ref Range Status   05/12/2020 42.4 34.0 - 46.6 % Final     Platelets   Date Value Ref Range Status   05/12/2020 271 140 - 450 10*3/mm3 Final        Lab " Results   Component Value Date    GLUCOSE 106 (H) 07/03/2019    BUN 13 07/03/2019    CREATININE 0.71 07/03/2019    EGFRIFNONA 83 07/03/2019    EGFRIFAFRI  09/14/2016      Comment:      <15 Indicative of kidney failure.    BCR 18.3 07/03/2019    K 3.2 (L) 07/03/2019    CO2 31.2 (H) 07/03/2019    CALCIUM 9.2 07/03/2019    ALBUMIN 4.10 07/03/2019    AST 13 07/03/2019    ALT 11 07/03/2019     Chest x-ray 3/28/2017: Normal radiograph.    Assessment/Plan     This is a pleasant 64-year-old lady with lymphocytosis present since 2016.  Flow cytometry 7/3/2019 confirmed chronic lymphocytic leukemia and the CLL FISH panel is negative.  She currently has no symptoms or side effects of chronic lymphocytic leukemia that requires treatment.  I reviewed the natural progression and indications for treatment of this disease with the patient.  I will see her back in 6 months with a CBC, CMP, LDH and status check.  Patient was encouraged to call in the interim if any constitutional symptoms or progression of lymphadenopathy etc.

## 2020-10-29 ENCOUNTER — TELEPHONE (OUTPATIENT)
Dept: ONCOLOGY | Facility: CLINIC | Age: 65
End: 2020-10-29

## 2020-11-03 ENCOUNTER — APPOINTMENT (OUTPATIENT)
Dept: LAB | Facility: HOSPITAL | Age: 65
End: 2020-11-03

## (undated) DEVICE — BNDG ELAS ELITE V/CLOSE 6IN 5YD LF STRL

## (undated) DEVICE — GLV SURG SENSICARE ORTHO PF LF 8.5 STRL

## (undated) DEVICE — IMMOB KN 3PNL DLX CANVS 22IN BLU

## (undated) DEVICE — KNEE HOLDER DISPOSABLE LINER: Brand: ALVARADO®  KNEE SUPPORT

## (undated) DEVICE — DISPOSABLE TOURNIQUET CUFF SINGLE BLADDER, SINGLE PORT AND LUER LOCK CONNECTOR: Brand: COLOR CUFF

## (undated) DEVICE — SPONGE,DRAIN,NONWVN,4"X4",6PLY,STRL,LF: Brand: MEDLINE

## (undated) DEVICE — FRAZIER SUCTION INSTRUMENT 10 FR W/CONTROL VENT & OBTURATOR: Brand: FRAZIER

## (undated) DEVICE — GLV SURG SENSICARE ORTHO PF LF 8 STRL

## (undated) DEVICE — PK KN TOTL 90

## (undated) DEVICE — GLV SURG SENSICARE MICRO PF LF 8.5 STRL

## (undated) DEVICE — TOWEL,OR,DSP,ST,BLUE,STD,4/PK,20PK/CS: Brand: MEDLINE

## (undated) DEVICE — TEMPERATURE THERAPY BLANKET: Brand: DEROYAL

## (undated) DEVICE — SUT VIC TP1 SZ2 27IN J849G

## (undated) DEVICE — SYR CONTRL LUERLOK 10CC

## (undated) DEVICE — BOWL AND CEMENT CARTRIDGE: Brand: ACM

## (undated) DEVICE — SYS SKIN CLS DERMABOND PRINEO W/22CM MESH TP

## (undated) DEVICE — PREP SOL POVIDONE/IODINE BT 4OZ

## (undated) DEVICE — HOOD: Brand: FLYTE

## (undated) DEVICE — GLV SURG NEOLON 2G PF LF 8.5 STRL

## (undated) DEVICE — Device

## (undated) DEVICE — DECANT BG O JET

## (undated) DEVICE — BREAKAWAY FEMORAL NOZZLE

## (undated) DEVICE — TRANSPOSAL ULTRAFLEX DUO/QUAD ULTRA CART MANIFOLD

## (undated) DEVICE — GLV SURG NEOLON 2G PF LF 8 STRL

## (undated) DEVICE — DRP Z/FRICTION 10X16IN

## (undated) DEVICE — CONTAINER,SPECIMEN,OR STERILE,4OZ: Brand: MEDLINE

## (undated) DEVICE — DEV NAV KN ALIGN

## (undated) DEVICE — APPL CHLORAPREP W/TINT 26ML ORNG

## (undated) DEVICE — DRSNG PAD ABD 8X10IN STRL

## (undated) DEVICE — TP SILK DURAPORE 2 IN

## (undated) DEVICE — DUAL CUT SAGITTAL BLADE

## (undated) DEVICE — NDL SPINE 22G 31/2IN BLK